# Patient Record
Sex: FEMALE | Race: WHITE | NOT HISPANIC OR LATINO | Employment: OTHER | URBAN - METROPOLITAN AREA
[De-identification: names, ages, dates, MRNs, and addresses within clinical notes are randomized per-mention and may not be internally consistent; named-entity substitution may affect disease eponyms.]

---

## 2023-10-30 PROBLEM — D21.9 FIBROIDS: Status: ACTIVE | Noted: 2023-10-30

## 2023-10-30 PROBLEM — F41.9 ANXIETY DISORDER: Status: ACTIVE | Noted: 2023-10-30

## 2023-10-30 PROBLEM — K82.4 GALLBLADDER POLYP: Status: ACTIVE | Noted: 2023-10-30

## 2023-10-30 PROBLEM — D25.1 INTRAMURAL LEIOMYOMA OF UTERUS: Status: ACTIVE | Noted: 2023-10-30

## 2023-10-30 PROBLEM — E78.5 HYPERLIPEMIA: Status: ACTIVE | Noted: 2023-10-30

## 2023-10-30 PROBLEM — R00.0 TACHYCARDIA: Status: ACTIVE | Noted: 2023-10-30

## 2023-10-30 PROBLEM — G47.30 HYPERSOMNIA WITH SLEEP APNEA: Status: ACTIVE | Noted: 2023-10-30

## 2023-10-30 PROBLEM — R93.89 THICKENED ENDOMETRIUM: Status: ACTIVE | Noted: 2023-10-30

## 2023-10-30 PROBLEM — R91.1 LUNG NODULE: Status: ACTIVE | Noted: 2023-10-30

## 2023-10-30 PROBLEM — N39.46 URGE AND STRESS INCONTINENCE: Status: ACTIVE | Noted: 2023-10-30

## 2023-10-30 PROBLEM — R10.9 ABDOMINAL CRAMPING: Status: ACTIVE | Noted: 2023-10-30

## 2023-10-30 PROBLEM — R94.39 ABNORMAL STRESS TEST: Status: ACTIVE | Noted: 2023-10-30

## 2023-10-30 PROBLEM — R10.32 CHRONIC LLQ PAIN: Status: ACTIVE | Noted: 2023-10-30

## 2023-10-30 PROBLEM — G89.29 CHRONIC LLQ PAIN: Status: ACTIVE | Noted: 2023-10-30

## 2023-10-30 PROBLEM — J45.909 ASTHMA (HHS-HCC): Status: ACTIVE | Noted: 2023-10-30

## 2023-10-30 PROBLEM — R53.83 FATIGUE: Status: ACTIVE | Noted: 2023-10-30

## 2023-10-30 PROBLEM — R10.2 FEMALE PELVIC PAIN: Status: ACTIVE | Noted: 2023-10-30

## 2023-10-30 PROBLEM — R06.02 SHORTNESS OF BREATH ON EXERTION: Status: ACTIVE | Noted: 2023-10-30

## 2023-10-30 PROBLEM — R10.11 POSTPRANDIAL RUQ PAIN: Status: ACTIVE | Noted: 2023-10-30

## 2023-10-30 PROBLEM — E66.9 OBESITY (BMI 30-39.9): Status: ACTIVE | Noted: 2023-10-30

## 2023-10-30 PROBLEM — I49.3 VENTRICULAR ECTOPY: Status: ACTIVE | Noted: 2023-10-30

## 2023-10-30 PROBLEM — G25.81 RESTLESS LEGS: Status: ACTIVE | Noted: 2023-10-30

## 2023-10-30 PROBLEM — R10.13 CHRONIC EPIGASTRIC PAIN: Status: ACTIVE | Noted: 2023-10-30

## 2023-10-30 PROBLEM — G47.33 OSA (OBSTRUCTIVE SLEEP APNEA): Status: ACTIVE | Noted: 2023-10-30

## 2023-10-30 PROBLEM — R09.02 HYPOXIA: Status: ACTIVE | Noted: 2023-10-30

## 2023-10-30 PROBLEM — G47.10 HYPERSOMNIA WITH SLEEP APNEA: Status: ACTIVE | Noted: 2023-10-30

## 2023-10-30 PROBLEM — K21.9 GERD (GASTROESOPHAGEAL REFLUX DISEASE): Status: ACTIVE | Noted: 2023-10-30

## 2023-10-30 PROBLEM — G89.29 CHRONIC EPIGASTRIC PAIN: Status: ACTIVE | Noted: 2023-10-30

## 2023-10-30 RX ORDER — LEVALBUTEROL TARTRATE 45 UG/1
2 AEROSOL, METERED ORAL EVERY 4 HOURS PRN
COMMUNITY
Start: 2022-05-17 | End: 2024-03-15 | Stop reason: SDUPTHER

## 2023-10-30 RX ORDER — BUSPIRONE HYDROCHLORIDE 15 MG/1
15 TABLET ORAL EVERY 12 HOURS
COMMUNITY
End: 2024-05-17 | Stop reason: SDUPTHER

## 2023-10-30 RX ORDER — HYDROCHLOROTHIAZIDE 25 MG/1
1 TABLET ORAL DAILY
COMMUNITY
Start: 2019-10-17

## 2023-10-30 RX ORDER — VIBEGRON 75 MG/1
TABLET, FILM COATED ORAL
COMMUNITY
Start: 2022-07-21 | End: 2024-05-07 | Stop reason: WASHOUT

## 2023-10-30 RX ORDER — TIZANIDINE 4 MG/1
1 TABLET ORAL 3 TIMES DAILY PRN
COMMUNITY

## 2023-10-30 RX ORDER — MONTELUKAST SODIUM 10 MG/1
1 TABLET ORAL NIGHTLY
COMMUNITY
Start: 2021-01-05 | End: 2023-12-08 | Stop reason: SDUPTHER

## 2023-10-30 RX ORDER — PANTOPRAZOLE SODIUM 40 MG/1
1 TABLET, DELAYED RELEASE ORAL DAILY
COMMUNITY
Start: 2021-01-05 | End: 2023-10-31 | Stop reason: SDUPTHER

## 2023-10-30 RX ORDER — ACETAMINOPHEN 325 MG/1
TABLET ORAL
COMMUNITY
Start: 2020-08-21

## 2023-10-30 RX ORDER — FERROUS GLUCONATE 325 MG
38 TABLET ORAL NIGHTLY
COMMUNITY

## 2023-10-30 RX ORDER — CARVEDILOL 25 MG/1
1 TABLET ORAL
COMMUNITY
Start: 2019-10-17

## 2023-10-30 RX ORDER — FAMOTIDINE 20 MG/1
TABLET, FILM COATED ORAL
COMMUNITY
Start: 2022-02-01 | End: 2023-10-31 | Stop reason: SDUPTHER

## 2023-10-30 RX ORDER — BUSPIRONE HYDROCHLORIDE 10 MG/1
1 TABLET ORAL 2 TIMES DAILY
COMMUNITY
Start: 2021-01-05 | End: 2023-10-31 | Stop reason: WASHOUT

## 2023-10-30 RX ORDER — SERTRALINE HYDROCHLORIDE 100 MG/1
1.5 TABLET, FILM COATED ORAL DAILY
COMMUNITY
Start: 2019-10-17

## 2023-10-30 RX ORDER — DICYCLOMINE HYDROCHLORIDE 10 MG/1
CAPSULE ORAL
COMMUNITY
Start: 2020-12-21 | End: 2024-05-30 | Stop reason: SDUPTHER

## 2023-10-31 ENCOUNTER — OFFICE VISIT (OUTPATIENT)
Dept: GASTROENTEROLOGY | Facility: CLINIC | Age: 68
End: 2023-10-31
Payer: MEDICARE

## 2023-10-31 VITALS
WEIGHT: 194 LBS | HEIGHT: 60 IN | OXYGEN SATURATION: 96 % | SYSTOLIC BLOOD PRESSURE: 128 MMHG | BODY MASS INDEX: 38.09 KG/M2 | HEART RATE: 84 BPM | DIASTOLIC BLOOD PRESSURE: 86 MMHG

## 2023-10-31 DIAGNOSIS — K21.9 GASTROESOPHAGEAL REFLUX DISEASE WITHOUT ESOPHAGITIS: Primary | ICD-10-CM

## 2023-10-31 DIAGNOSIS — R10.9 ABDOMINAL CRAMPING: ICD-10-CM

## 2023-10-31 PROCEDURE — 99214 OFFICE O/P EST MOD 30 MIN: CPT | Performed by: NURSE PRACTITIONER

## 2023-10-31 PROCEDURE — 1036F TOBACCO NON-USER: CPT | Performed by: NURSE PRACTITIONER

## 2023-10-31 PROCEDURE — 1159F MED LIST DOCD IN RCRD: CPT | Performed by: NURSE PRACTITIONER

## 2023-10-31 RX ORDER — PANTOPRAZOLE SODIUM 40 MG/1
40 TABLET, DELAYED RELEASE ORAL DAILY
Qty: 90 TABLET | Refills: 3 | Status: SHIPPED | OUTPATIENT
Start: 2023-10-31

## 2023-10-31 RX ORDER — FAMOTIDINE 20 MG/1
20 TABLET, FILM COATED ORAL
Qty: 90 TABLET | Refills: 3 | Status: SHIPPED | OUTPATIENT
Start: 2023-10-31

## 2023-10-31 RX ORDER — ROSUVASTATIN CALCIUM 20 MG/1
TABLET, COATED ORAL EVERY 24 HOURS
COMMUNITY
Start: 2023-06-06

## 2023-10-31 ASSESSMENT — ENCOUNTER SYMPTOMS
PALPITATIONS: 0
CHILLS: 0
WOUND: 0
TROUBLE SWALLOWING: 0
WEAKNESS: 0
ROS GI COMMENTS: SEE HPI
DIZZINESS: 0
COUGH: 0
SORE THROAT: 0
BRUISES/BLEEDS EASILY: 0
ARTHRALGIAS: 0
ADENOPATHY: 0
JOINT SWELLING: 0
SHORTNESS OF BREATH: 0
CONFUSION: 0
FEVER: 0
DIFFICULTY URINATING: 0

## 2023-10-31 NOTE — ASSESSMENT & PLAN NOTE
Symptoms well controlled with pantoprazole 40mg daily and famotidine 20mg daily. EGD in November 2020 showed mild gastritis and fundic gland polyps, but was otherwise normal. Continue current medications; pt informed of PPI risks.

## 2023-10-31 NOTE — PROGRESS NOTES
Subjective   Patient ID:     HPI  RADU GARCIA is a 68 year old female with past medical history of covid-19, lung nodule, gastric ulcer, anxiety, depression, and HTN who presents for follow up-- med refills     PCP is Dr. Quiles     Patient last seen 9/2022 for follow up. Her reflux was well-controlled with pantoprazole 40mg daily and famotidine 20mg daily at that time. She was taking bentyl 10mg once daily and IBgard once daily which helped with the abdominal pain she had. CT A/P in 2020 showed uterine fibroids which she has seen gynecology for and biopsy was unremarkable; per her gynecologist no further action was needed. She also saw general surgery for a gallbladder polyp and repeat imaging was completed; it had not grown and size and did not require further follow up per surgery.     She continues to do well from a GI standpoint. Reflux remains well controlled with her pantoprazole 40mg daily and famotidine 20mg daily. She is using the bentyl and IBgard once daily which has continued to help with the abdominal pain. No dizziness, blurry vision, dry mouth, trouble urinating, or constipation issues with her medication. She occasionally has breakthrough heartburn with overeating.     She otherwise denies unintended weight loss, dysphagia, N/V, hematemesis, melena, diarrhea, constipation, or hematochezia.       Summary of endoscopies:  - Colonoscopy 1/2018: normal   - EGD 8/2020: multiple gastric erosions and superficial ulcers, benign fundic gland polyps, normal esophagus and duodenum   - EGD 11/2020: normal esophagus, mild gastritis, gastric polyps, mild duodenum. (polyps are fundic gland polyps on pathology)       Social Hx:  tobacco: none  etoh: none  illicit drug use: none  NSAIDs: none    Family Hx:  No family hx GI malignancy, pancreatitis,or IBD        Summary of endoscopies:        Review of Systems   Constitutional:  Negative for chills and fever.   HENT:  Negative for sore throat and trouble  swallowing.    Respiratory:  Negative for cough and shortness of breath.    Cardiovascular:  Negative for chest pain and palpitations.   Gastrointestinal:         SEE HPI   Endocrine: Negative for cold intolerance and heat intolerance.   Genitourinary:  Negative for difficulty urinating.   Musculoskeletal:  Negative for arthralgias and joint swelling.   Skin:  Negative for rash and wound.   Neurological:  Negative for dizziness and weakness.   Hematological:  Negative for adenopathy. Does not bruise/bleed easily.   Psychiatric/Behavioral:  Negative for confusion.         Medications:  Prior to Admission medications    Medication Sig Start Date End Date Taking? Authorizing Provider   acetaminophen (TylenoL) 325 mg tablet Take by mouth. 8/21/20  Yes Historical Provider, MD   busPIRone (Buspar) 15 mg tablet Take 1 tablet (15 mg) by mouth every 12 hours.   Yes Historical Provider, MD   carvedilol (Coreg) 25 mg tablet Take 1 tablet (25 mg) by mouth 2 times a day with meals. 10/17/19  Yes Historical Provider, MD   dicyclomine (Bentyl) 10 mg capsule Take by mouth. 12/21/20  Yes Historical Provider, MD   famotidine (Pepcid) 20 mg tablet Take by mouth once daily. 2/1/22  Yes Historical Provider, MD   ferrous gluconate (Fergon) 324 (38 Fe) mg tablet Take 1 tablet (38 mg of iron) by mouth once daily at bedtime.   Yes Historical Provider, MD   hydroCHLOROthiazide (HYDRODiuril) 25 mg tablet Take 1 tablet (25 mg) by mouth once daily. 10/17/19  Yes Historical Provider, MD   levalbuterol (Xopenex HFA) 45 mcg/actuation inhaler Inhale 2 puffs every 4 hours if needed. 5/17/22  Yes Historical Provider, MD   montelukast (Singulair) 10 mg tablet Take 1 tablet (10 mg) by mouth once daily at bedtime. 1/5/21  Yes Historical Provider, MD   pantoprazole (ProtoNix) 40 mg EC tablet Take 1 tablet (40 mg) by mouth once daily. 1/5/21  Yes Historical Provider, MD   rosuvastatin (Crestor) 20 mg tablet once every 24 hours. 6/6/23  Yes Historical  Provider, MD   sertraline (Zoloft) 100 mg tablet Take 1.5 tablets (150 mg) by mouth once daily. 10/17/19  Yes Historical Provider, MD   tiZANidine (Zanaflex) 4 mg tablet Take 1 tablet (4 mg) by mouth 3 times a day as needed.   Yes Historical Provider, MD   vibegron (Gemtesa) 75 mg tablet Take by mouth. 7/21/22  Yes Historical Provider, MD   busPIRone (Buspar) 10 mg tablet Take 1 tablet (10 mg) by mouth 2 times a day. 1/5/21 10/31/23 Yes Historical Provider, MD       Allergies:  Ace inhibitors    Past Medical History:  She has a past medical history of Anxiety disorder, unspecified, Muscle spasm of back, Personal history of other diseases of the circulatory system, and Personal history of peptic ulcer disease (12/21/2020).    Past Surgical History:  She has a past surgical history that includes Other surgical history (07/21/2020).    Social History:  She reports that she has never smoked. She has never used smokeless tobacco. She reports current alcohol use. She reports that she does not use drugs.    Objective   Physical exam:  Physical Exam  Constitutional:       General: She is not in acute distress.     Appearance: Normal appearance.   HENT:      Mouth/Throat:      Mouth: Mucous membranes are moist.      Comments: pink  Eyes:      Conjunctiva/sclera: Conjunctivae normal.      Pupils: Pupils are equal, round, and reactive to light.   Cardiovascular:      Rate and Rhythm: Normal rate and regular rhythm.      Heart sounds: No murmur heard.  Pulmonary:      Effort: Pulmonary effort is normal.      Breath sounds: Normal breath sounds.   Abdominal:      General: Bowel sounds are normal. There is no distension.      Palpations: Abdomen is soft.      Tenderness: There is no abdominal tenderness. There is no guarding.   Skin:     General: Skin is warm and dry.      Coloration: Skin is not jaundiced.   Neurological:      Mental Status: She is alert and oriented to person, place, and time.   Psychiatric:         Mood and  Affect: Mood normal.         Behavior: Behavior normal.          Assessment/Plan   Problem List Items Addressed This Visit             ICD-10-CM    Abdominal cramping R10.9     Improved with dicyclomine 10mg daily and IBgard once daily. She denies any side effects from medication; discussed increased risks of side effects today with being over age 65.          GERD (gastroesophageal reflux disease) - Primary K21.9     Symptoms well controlled with pantoprazole 40mg daily and famotidine 20mg daily. EGD in November 2020 showed mild gastritis and fundic gland polyps, but was otherwise normal. Continue current medications; pt informed of PPI risks.          Relevant Medications    famotidine (Pepcid) 20 mg tablet    pantoprazole (ProtoNix) 40 mg EC tablet    Other Relevant Orders    Follow Up In Gastroenterology              YOVANI Simms-CNP

## 2023-10-31 NOTE — PATIENT INSTRUCTIONS
Thank you for coming to your appointment today   - I will send refills of the pantoprazole and famotidine for 1 year  - Let me know if you need refills of dicyclomine; we discussed the side effects of this today including dizziness, increase risks of falls, dry mouth, blurry vision, inability to urinate, and constipation   - Follow up in 1 year or sooner if needed     Call with any questions or concerns [phone]       You have been prescribed a medication to block acid in your stomach such as Omeprazole (Prilosec), Pantoprazole (Protonix), Esomeprazole (Nexium), Lansoprazole (Prevacid), or Dexlansoprazole (Dexilant).  These medications are in a class known as “Proton Pump Inhibitors” or “PPIs” and they all work in the same way.  They work by blocking acid pumps in your stomach to limit the amount of acid that is produced.  These medications are very common and many are available over the counter.  As with any medication there are risks of side effects from these medications and the risks are likely similar regardless of which specific medication you are on.  There are some reported risks that you may hear about which are detailed below.    PPIs are the most effective medical treatment for GERD (gastroesophageal reflux disease). Some medical studies have identified an association between the long-term use of PPIs and the development of numerous adverse conditions including intestinal infections, pneumonia, stomach cancer, osteoporosis-related bone fracture, chronic kidney disease, deficiencies of certain vitamins and minerals, heart attacks, strokes, dementia, and early death. Those studies have flaws, are not considered definitive, and do not establish a cause-and-effect relationship between PPIs and the adverse conditions. High-quality studies have found that PPIs do not significantly increase the risk of any of these conditions except certain intestinal infections. Nevertheless, we cannot exclude the  possibility that PPIs might confer a small increase in the risk of developing these adverse conditions. For the treatment of GERD, gastroenterologists generally agree that the well-established benefits of PPIs far outweigh their theoretical risks.    Kidney Disease:  Several studies have shown a weak association between PPI use and chronic kidney disease.  There have been limitations with these studies and the actual risk is not known.  Some data from these studies suggested that long term PPI use may actually protect against kidney disease.  The data is mixed and it is not certain that PPIs increase the risk of kidney disease.  National GI societies do not recommend routinely checking kidney function while you are on these medications.    Dementia:  There previously were studies which suggested a link between PPI use and risk of dementia.  Further studies have not shown this risk.    Bone Health:  Decreased acid in the stomach may impair absorption of minerals important for bone health such as calcium.  The data is conflicting and some studies suggest an increased risk of fractures while others do not.  Absorption of only some forms of calcium is impaired and there should be no decrease in absorption of calcium contained in milk, cheese, or other dairy products.  Any calcium consumed as a part of a slightly acidic meal is also not affected.  National GI societies do not recommend changing bone health screening (DEXA scans) recommendations or routinely testing calcium levels or taking a calcium supplement for patients on PPI therapy.  Your primary care provider can talk to you about your specific fracture risk and what screening or nutritional supplements you should be on.    Infections:  There appears to be an increased risk of certain infections such a Clostridium difficile (C diff) although this risk appears to be most important in certain populations and this risk does not generally require that PPI therapy be  stopped.  At times there have been studies that suggested an increased risk of pneumonia in patients on PPI therapy.  Additional studies have not shown this risk and the initial studies did not show the risk in patients on long term therapy, but rather those who had recently started the medication.  Probiotics are not recommended to prevent these infections.    Overall these medications are considered safe.  As with any medication it is a good idea to be on the lowest effective dose for treating your condition and some patients may be able to pursue a trial of stopping these medications to see if they actually require long term therapy.  Overall, the recommendations from national GI societies is to continue these medications in patients with a clear indication for them.

## 2023-10-31 NOTE — ASSESSMENT & PLAN NOTE
Improved with dicyclomine 10mg daily and IBgard once daily. She denies any side effects from medication; discussed increased risks of side effects today with being over age 63.

## 2023-12-05 ENCOUNTER — TELEPHONE (OUTPATIENT)
Dept: UROLOGY | Facility: CLINIC | Age: 68
End: 2023-12-05
Payer: MEDICARE

## 2023-12-08 ENCOUNTER — OFFICE VISIT (OUTPATIENT)
Dept: PULMONOLOGY | Facility: HOSPITAL | Age: 68
End: 2023-12-08
Payer: MEDICARE

## 2023-12-08 VITALS
DIASTOLIC BLOOD PRESSURE: 71 MMHG | OXYGEN SATURATION: 93 % | SYSTOLIC BLOOD PRESSURE: 112 MMHG | HEART RATE: 75 BPM | WEIGHT: 195.3 LBS | HEIGHT: 60 IN | TEMPERATURE: 98.7 F | RESPIRATION RATE: 16 BRPM | BODY MASS INDEX: 38.34 KG/M2

## 2023-12-08 DIAGNOSIS — J30.9 ALLERGIC RHINITIS, UNSPECIFIED SEASONALITY, UNSPECIFIED TRIGGER: ICD-10-CM

## 2023-12-08 DIAGNOSIS — J45.909 ASTHMA, UNSPECIFIED ASTHMA SEVERITY, UNSPECIFIED WHETHER COMPLICATED, UNSPECIFIED WHETHER PERSISTENT (HHS-HCC): Primary | ICD-10-CM

## 2023-12-08 DIAGNOSIS — F41.9 ANXIETY: ICD-10-CM

## 2023-12-08 DIAGNOSIS — R91.1 LUNG NODULE: ICD-10-CM

## 2023-12-08 DIAGNOSIS — G47.33 OSA (OBSTRUCTIVE SLEEP APNEA): ICD-10-CM

## 2023-12-08 PROCEDURE — 99213 OFFICE O/P EST LOW 20 MIN: CPT | Mod: ZK | Performed by: NURSE PRACTITIONER

## 2023-12-08 PROCEDURE — 1159F MED LIST DOCD IN RCRD: CPT | Performed by: NURSE PRACTITIONER

## 2023-12-08 PROCEDURE — 1036F TOBACCO NON-USER: CPT | Performed by: NURSE PRACTITIONER

## 2023-12-08 PROCEDURE — 99213 OFFICE O/P EST LOW 20 MIN: CPT | Performed by: NURSE PRACTITIONER

## 2023-12-08 RX ORDER — MONTELUKAST SODIUM 10 MG/1
10 TABLET ORAL NIGHTLY
Qty: 30 TABLET | Refills: 11 | Status: SHIPPED | OUTPATIENT
Start: 2023-12-08

## 2023-12-08 ASSESSMENT — ENCOUNTER SYMPTOMS
CHILLS: 0
COUGH: 0
UNEXPECTED WEIGHT CHANGE: 0
FEVER: 0
WHEEZING: 0
RHINORRHEA: 0
SHORTNESS OF BREATH: 1
FATIGUE: 0

## 2023-12-08 NOTE — PATIENT INSTRUCTIONS
1. Continue Xopenex 1-2 puffs as needed for shortness of breath, wheezing, or before prolonged activity.   2. Continue buspirone and will increase to 15 mg 1 tablet twice daily for anxiety.   3. Continue montelukast for asthma 1 tablet nightly.   4. Call with any questions or concerns.   5. Follow up with me as needed.

## 2023-12-08 NOTE — PROGRESS NOTES
Subjective   Patient ID: Luciana Figueroa is a 68 y.o. female who presents for asthma follow up.     HPI: Patient has PMH of asthma, covid, lung nodule, allergic rhinitis, anxiety, depression, and HTN. She states that her breathing is stable and she only uses Xopenex with weather changes mostly. She states Montelukast helps improve s/s. She has GARY but noncompliant with CPAP. She has no other concerns.     Review of Systems   Constitutional:  Negative for chills, fatigue, fever and unexpected weight change.   HENT:  Negative for congestion, postnasal drip and rhinorrhea.    Respiratory:  Positive for shortness of breath. Negative for cough (denies hemoptysis.) and wheezing.    Cardiovascular:  Negative for chest pain and leg swelling.   All other systems reviewed and are negative.      Objective   Physical Exam  Vitals reviewed.   Constitutional:       Appearance: Normal appearance.   HENT:      Head: Normocephalic.   Cardiovascular:      Rate and Rhythm: Normal rate and regular rhythm.   Pulmonary:      Effort: Pulmonary effort is normal.      Breath sounds: Normal breath sounds.   Skin:     General: Skin is warm and dry.   Neurological:      Mental Status: She is alert.         Assessment/Plan   1. Bronchial Asthma  2. Allergic rhinitis  3. Obstructive sleep apnea, noncompliant   4. Right lower lobe lung nodule stable since Dec 2019  5. Anxiety disorder  6. Obesity  7. Stage I diastolic dysfunction  8. Hypoxia, Obesity hypoventilation   9. GERD/Hiatal hernia and PUD     Plan:  -She was unable to afford Breo and has tried multiple other maintenance inhalers which did not help. Will continue on Xopenex prn.   -Continue Montelukast HS  -CT chest for follow up on lung nodule is stable since April 2020.   -I recommend a psychiatry consult for her. Continue buspirone for anxiety and increase to 15 mg BID.   -Weight loss as discussed.  -Cont pantoprazole  -HST discussed showing GARY that is severe she has been unable to  tolerate CPAP due to anxiety. She states she is going to send CPAP back to DME. I discussed risks of untreated GARY at length. We discussed risk of cerebrovascular, cardiovascular and metabolic morbidity and mortality associated with untreated GARY. SHe states there is no way she will use CPAP. She has been evaluated by ENT for inspire and not deemed a candidate due to BMI >35, oral appliance not indicated and not interested in that. She will work on weight loss and sleep on her side.  -6MWT with exertional hypoxia of 91% but did not require O2 supplementation. Suspect this is secondary to OHS. Repeat 6MWT without need for O2 supplementation with activity.  -Iron panel with Iron 61 and ferritin 19. Recommend iron supplementation also from RLS standpoint.   -Discussed weight loss program such as silver sneaker, increase physical activity regimen         Overall her breathing is stable on Xopenex prn and Montelukast. She has no other concerns and uninterested in restarting CPAP. I will have her follow up on an as needed basis.

## 2024-02-05 NOTE — PROGRESS NOTES
[facility] - Dr. Sameer Milligan    Established Patient  Visit    PCP: Elsie Quiles MD    Chief Complaint/Reason for visit: annual FU OAB    HPI:   On vibegron 75 mg every day  No side effects  No interval UTI  Down to 1-2 pads per day  VERY happy    Past Medical History:   Diagnosis Date    Anxiety disorder, unspecified     Anxiety and depression    Muscle spasm of back     Back spasm    Personal history of other diseases of the circulatory system     History of hypertension    Personal history of peptic ulcer disease 12/21/2020    History of gastric ulcer     Past Surgical History:   Procedure Laterality Date    OTHER SURGICAL HISTORY  07/21/2020    Colonoscopy     Social History     Socioeconomic History    Marital status:      Spouse name: Not on file    Number of children: Not on file    Years of education: Not on file    Highest education level: Not on file   Occupational History    Not on file   Tobacco Use    Smoking status: Never    Smokeless tobacco: Never   Substance and Sexual Activity    Alcohol use: Yes     Comment: occassionally    Drug use: Never    Sexual activity: Not on file   Other Topics Concern    Not on file   Social History Narrative    Not on file     Social Determinants of Health     Financial Resource Strain: Not on file   Food Insecurity: Not on file   Transportation Needs: Not on file   Physical Activity: Not on file   Stress: Not on file   Social Connections: Not on file   Intimate Partner Violence: Not on file   Housing Stability: Not on file     Current Outpatient Medications   Medication Instructions    acetaminophen (TylenoL) 325 mg tablet oral    busPIRone (BUSPAR) 15 mg, oral, Every 12 hours    carvedilol (Coreg) 25 mg tablet 1 tablet, oral, 2 times daily with meals    dicyclomine (Bentyl) 10 mg capsule oral    famotidine (PEPCID) 20 mg, oral, Daily RT    ferrous gluconate (Fergon) 324 (38 Fe) mg tablet 38 mg of iron, oral, Nightly    hydroCHLOROthiazide  (HYDRODiuril) 25 mg tablet 1 tablet, oral, Daily    levalbuterol (Xopenex HFA) 45 mcg/actuation inhaler 2 puffs, inhalation, Every 4 hours PRN    montelukast (SINGULAIR) 10 mg, oral, Nightly    pantoprazole (PROTONIX) 40 mg, oral, Daily    rosuvastatin (Crestor) 20 mg tablet Every 24 hours    sertraline (Zoloft) 100 mg tablet 1.5 tablets, oral, Daily    tiZANidine (Zanaflex) 4 mg tablet 1 tablet, oral, 3 times daily PRN    vibegron (Gemtesa) 75 mg tablet oral     Allergies   Allergen Reactions    Ace Inhibitors Unknown          Physical Exam:  General: Alert, cooperative, no acute distress  Eyes: Sclera clear  Cardiac: Extremities are warm and well perfused  Lungs: Breathing non-labored. Speaking in clear and complete sentences.  MSK: Ambulatory with steady gait   Neuro: Alert and oriented to person, place, and time  Psych: Normal mood and affect  Skin: No obvious lesions or rashes    Assessment and Plan:    1. Overactive bladder  Doing well on vibegron  Discussed 3rd line OAB therapies - defers for now  FUV 3-6 months Sara rechricarda

## 2024-02-06 ENCOUNTER — OFFICE VISIT (OUTPATIENT)
Dept: UROLOGY | Facility: CLINIC | Age: 69
End: 2024-02-06
Payer: MEDICARE

## 2024-02-06 DIAGNOSIS — N32.81 OVERACTIVE BLADDER: Primary | ICD-10-CM

## 2024-02-06 PROCEDURE — 1159F MED LIST DOCD IN RCRD: CPT | Performed by: STUDENT IN AN ORGANIZED HEALTH CARE EDUCATION/TRAINING PROGRAM

## 2024-02-06 PROCEDURE — 99213 OFFICE O/P EST LOW 20 MIN: CPT | Performed by: STUDENT IN AN ORGANIZED HEALTH CARE EDUCATION/TRAINING PROGRAM

## 2024-02-06 PROCEDURE — 1036F TOBACCO NON-USER: CPT | Performed by: STUDENT IN AN ORGANIZED HEALTH CARE EDUCATION/TRAINING PROGRAM

## 2024-02-09 ENCOUNTER — DOCUMENTATION (OUTPATIENT)
Dept: UROLOGY | Facility: CLINIC | Age: 69
End: 2024-02-09
Payer: MEDICARE

## 2024-02-26 NOTE — PROGRESS NOTES
Counseling:  The patient was counseled regarding diagnostic results, instructions for management, risk factor reductions, prognosis, patient and family education, impressions, risks and benefits of treatment options and importance of compliance with treatment.      Chief Complaint:    The patient presents today for annual followup of exertional SOB.    History Of Present Illness:    Luciana Figueroa is a 68 year old female patient who presents today for annual followup of exertional SOB. Her PMH is significant for asthma, hypersomnia with GARY, anxiety, GERD, and hyperlipidemia. Over the past year, the patient states that she has done well from a cardiac standpoint. She reports persistent exertional SOB, although stable and at baseline. She denies any chest pain or discomfort. BP has been stable. EKG today is stable with no acute changes. The patient is compliant with her prescribed medications.     Last Recorded Vitals:  Vitals:    02/27/24 1320   BP: 118/88   BP Location: Left arm   Pulse: 67   Weight: 87.1 kg (192 lb)   Height: 1.524 m (5')       Past Surgical History:  She has a past surgical history that includes Other surgical history (07/21/2020).      Social History:  She reports that she has never smoked. She has never used smokeless tobacco. She reports current alcohol use. She reports that she does not use drugs.    Family History:  Family History   Problem Relation Name Age of Onset    Heart failure Mother      Coronary artery disease Father      Lung cancer Brother      Breast cancer Father's Sister          Allergies:  Patient has no known allergies.    Outpatient Medications:  Current Outpatient Medications   Medication Instructions    acetaminophen (TylenoL) 325 mg tablet oral    busPIRone (BUSPAR) 15 mg, oral, Every 12 hours    carvedilol (Coreg) 25 mg tablet 1 tablet, oral, 2 times daily with meals    dicyclomine (Bentyl) 10 mg capsule oral    famotidine (PEPCID) 20 mg, oral, Daily RT    ferrous  gluconate (Fergon) 324 (38 Fe) mg tablet 38 mg of iron, oral, Nightly    hydroCHLOROthiazide (HYDRODiuril) 25 mg tablet 1 tablet, oral, Daily    levalbuterol (Xopenex HFA) 45 mcg/actuation inhaler 2 puffs, inhalation, Every 4 hours PRN    montelukast (SINGULAIR) 10 mg, oral, Nightly    pantoprazole (PROTONIX) 40 mg, oral, Daily    rosuvastatin (Crestor) 20 mg tablet Every 24 hours    sertraline (Zoloft) 100 mg tablet 1.5 tablets, oral, Daily    tiZANidine (Zanaflex) 4 mg tablet 1 tablet, oral, 3 times daily PRN    vibegron (Gemtesa) 75 mg tablet oral     Review of Systems   Cardiovascular:  Positive for dyspnea on exertion.   All other systems reviewed and are negative.     Physical Exam:  Constitutional:       Appearance: Healthy appearance. Not in distress.   Neck:      Vascular: No JVR. JVD normal.   Pulmonary:      Effort: Pulmonary effort is normal.      Breath sounds: Normal breath sounds. No wheezing. No rhonchi. No rales.   Chest:      Chest wall: Not tender to palpatation.   Cardiovascular:      PMI at left midclavicular line. Normal rate. Regular rhythm. Normal S1. Normal S2.       Murmurs: There is no murmur.      No gallop.  No click. No rub.   Pulses:     Intact distal pulses.   Edema:     Peripheral edema absent.   Abdominal:      General: Bowel sounds are normal.      Palpations: Abdomen is soft.      Tenderness: There is no abdominal tenderness.   Musculoskeletal: Normal range of motion.         General: No tenderness. Skin:     General: Skin is warm and dry.   Neurological:      General: No focal deficit present.      Mental Status: Alert and oriented to person, place and time.          Last Labs:  CBC -  Lab Results   Component Value Date    WBC 7.9 05/03/2022    HGB 14.4 05/03/2022    HCT 43.8 05/03/2022    MCV 90 05/03/2022     05/03/2022       CMP -  Lab Results   Component Value Date    CALCIUM 9.6 05/03/2022    PROT 7.3 05/03/2022    ALBUMIN 4.2 05/03/2022    AST 15 05/03/2022    ALT 15  05/03/2022    ALKPHOS 69 05/03/2022    BILITOT 0.4 05/03/2022       RENAL FUNCTION PANEL -   Lab Results   Component Value Date    GLUCOSE 88 05/03/2022     05/03/2022    K 3.4 (L) 05/03/2022     05/03/2022    CO2 28 05/03/2022    ANIONGAP 11 05/03/2022    BUN 21 05/03/2022    CREATININE 0.72 05/03/2022    CALCIUM 9.6 05/03/2022    ALBUMIN 4.2 05/03/2022        Lab Results   Component Value Date    BNP 28 10/26/2020       Last Cardiology Tests:  10/21/2020 - CT Chest w/Contrast  1. Bilateral patchy opacities consistent with active pneumonitis/pneumonia. This can be seen with COVID-19 infection.  2. Stable 1 cm smooth nonspecific noncalcified right lower lobe pulmonary nodule compared to 04/06/2020 with no other older studies for comparison.     04/06/2020 - TTE  1. The left ventricular systolic function is normal with a 60% estimated ejection fraction.  2. Spectral Doppler shows an impaired relaxation pattern of left ventricular diastolic filling.  3. RVSP within normal limits.  4. Aortic valve stenosis is not present.     04/06/2020 - CT Angio Chest for PE  1. No evidence of pulmonary embolism.  2. Right lower lobe 1.1 cm solitary pulmonary nodule. It is stable when compared to short-term prior imaging, CT abdomen and pelvis from November 2019; however, there is no long-term imaging available for comparison. Possible considerations include PET-CT and/or follow-up chest CT in 3-6 months.  3. No evidence of any enlarged mediastinal or hilar lymph nodes.     03/09/2020 - CT Chest w/o Contrast   Clear lungs. No acute process.     01/27/2020 - Cardiac Catheterization (LH)  No evidence of significant coronary artery disease.     01/08/2020 - Exercise Stress Test  1. Abnormal stress test due to chest pain and ventricular ectopy.  2. The inadequate level of stress was achieved. The patient achieved 1:36 minutes with a MPHR 89%  3. Very poor exercise capacity.  4. Consider additional cardiac evaluation.      Assessment/Plan   1) Exertional SOB   Continue carvedilol 25 mg BID, HCTZ 25 mg daily, rosuvastatin 20 mg daily  LHC negative in Jan. 2020  Negative echocardiogram April 2020  No PE by CT chest April 2020  Repeat CT chest 04/2021 with bronchitis and stable right lower lobe pulmonary nodule - follows with pulmonology  Persistent exertional SOB - stable and at baseline  Denies CP or discomfort  EKG stable  BP stable  Check echo  Check Lexiscan Cardiolite stress test - unable to tolerate treadmill stress s/t poor functional capacity     2) Lung Nodule  Followed by Pulmonary   CT chest 04/2021 with stable RLL pulmonary nodule   CT chest 03/2023 with stable findings     3) GARY  Management per pulmonary  Does not tolerate BiPAP/CPAP      Scribe Attestation  By signing my name below, I, Paulina Angulo   attest that this documentation has been prepared under the direction and in the presence of Osvaldo Barnes MD.

## 2024-02-27 ENCOUNTER — OFFICE VISIT (OUTPATIENT)
Dept: CARDIOLOGY | Facility: CLINIC | Age: 69
End: 2024-02-27
Payer: MEDICARE

## 2024-02-27 VITALS
DIASTOLIC BLOOD PRESSURE: 88 MMHG | HEART RATE: 67 BPM | WEIGHT: 192 LBS | SYSTOLIC BLOOD PRESSURE: 118 MMHG | BODY MASS INDEX: 37.69 KG/M2 | HEIGHT: 60 IN

## 2024-02-27 DIAGNOSIS — R06.02 SHORTNESS OF BREATH ON EXERTION: Primary | ICD-10-CM

## 2024-02-27 PROCEDURE — 93000 ELECTROCARDIOGRAM COMPLETE: CPT | Performed by: INTERNAL MEDICINE

## 2024-02-27 PROCEDURE — 1159F MED LIST DOCD IN RCRD: CPT | Performed by: INTERNAL MEDICINE

## 2024-02-27 PROCEDURE — 1160F RVW MEDS BY RX/DR IN RCRD: CPT | Performed by: INTERNAL MEDICINE

## 2024-02-27 PROCEDURE — 1036F TOBACCO NON-USER: CPT | Performed by: INTERNAL MEDICINE

## 2024-02-27 PROCEDURE — 99213 OFFICE O/P EST LOW 20 MIN: CPT | Performed by: INTERNAL MEDICINE

## 2024-02-27 ASSESSMENT — ENCOUNTER SYMPTOMS
DEPRESSION: 1
DYSPNEA ON EXERTION: 1
OCCASIONAL FEELINGS OF UNSTEADINESS: 1
LOSS OF SENSATION IN FEET: 0

## 2024-02-27 NOTE — PATIENT INSTRUCTIONS
Continue all current medications as prescribed.   Dr. Barnes has ordered a stress test to ensure your heart is getting adequate blood flow and there is no evidence of any blockages within the heart arteries.    Dr. Barnes has also ordered an echocardiogram (ultrasound of the heart) to followup on your heart function and structure.   Followup with Dr. Barnes after the above tests.    If you have any questions or cardiac concerns, please call our office at [phone].

## 2024-02-27 NOTE — LETTER
February 27, 2024     Elsie Quiles MD  [address] [place] [address]    Patient: Luciana Figueroa   YOB: 1955   Date of Visit: 2/27/2024       Dear Dr. Elsie Quiles MD:    Thank you for referring Luciana Figueroa to me for evaluation. Below are my notes for this consultation.  If you have questions, please do not hesitate to call me. I look forward to following your patient along with you.       Sincerely,     Osvaldo Barnes MD      CC: No Recipients  ______________________________________________________________________________________    Counseling:  The patient was counseled regarding diagnostic results, instructions for management, risk factor reductions, prognosis, patient and family education, impressions, risks and benefits of treatment options and importance of compliance with treatment.      Chief Complaint:    The patient presents today for annual followup of exertional SOB.    History Of Present Illness:    Luciana Figueroa is a 68 year old female patient who presents today for annual followup of exertional SOB. Her PMH is significant for asthma, hypersomnia with GARY, anxiety, GERD, and hyperlipidemia. Over the past year, the patient states that she has done well from a cardiac standpoint. She reports persistent exertional SOB, although stable and at baseline. She denies any chest pain or discomfort. BP has been stable. EKG today is stable with no acute changes. The patient is compliant with her prescribed medications.     Last Recorded Vitals:  Vitals:    02/27/24 1320   BP: 118/88   BP Location: Left arm   Pulse: 67   Weight: 87.1 kg (192 lb)   Height: 1.524 m (5')       Past Surgical History:  She has a past surgical history that includes Other surgical history (07/21/2020).      Social History:  She reports that she has never smoked. She has never used smokeless tobacco. She reports current alcohol use. She reports that she does not use drugs.    Family History:  Family  History   Problem Relation Name Age of Onset   • Heart failure Mother     • Coronary artery disease Father     • Lung cancer Brother     • Breast cancer Father's Sister          Allergies:  Patient has no known allergies.    Outpatient Medications:  Current Outpatient Medications   Medication Instructions   • acetaminophen (TylenoL) 325 mg tablet oral   • busPIRone (BUSPAR) 15 mg, oral, Every 12 hours   • carvedilol (Coreg) 25 mg tablet 1 tablet, oral, 2 times daily with meals   • dicyclomine (Bentyl) 10 mg capsule oral   • famotidine (PEPCID) 20 mg, oral, Daily RT   • ferrous gluconate (Fergon) 324 (38 Fe) mg tablet 38 mg of iron, oral, Nightly   • hydroCHLOROthiazide (HYDRODiuril) 25 mg tablet 1 tablet, oral, Daily   • levalbuterol (Xopenex HFA) 45 mcg/actuation inhaler 2 puffs, inhalation, Every 4 hours PRN   • montelukast (SINGULAIR) 10 mg, oral, Nightly   • pantoprazole (PROTONIX) 40 mg, oral, Daily   • rosuvastatin (Crestor) 20 mg tablet Every 24 hours   • sertraline (Zoloft) 100 mg tablet 1.5 tablets, oral, Daily   • tiZANidine (Zanaflex) 4 mg tablet 1 tablet, oral, 3 times daily PRN   • vibegron (Gemtesa) 75 mg tablet oral     Review of Systems   Cardiovascular:  Positive for dyspnea on exertion.   All other systems reviewed and are negative.     Physical Exam:  Constitutional:       Appearance: Healthy appearance. Not in distress.   Neck:      Vascular: No JVR. JVD normal.   Pulmonary:      Effort: Pulmonary effort is normal.      Breath sounds: Normal breath sounds. No wheezing. No rhonchi. No rales.   Chest:      Chest wall: Not tender to palpatation.   Cardiovascular:      PMI at left midclavicular line. Normal rate. Regular rhythm. Normal S1. Normal S2.       Murmurs: There is no murmur.      No gallop.  No click. No rub.   Pulses:     Intact distal pulses.   Edema:     Peripheral edema absent.   Abdominal:      General: Bowel sounds are normal.      Palpations: Abdomen is soft.      Tenderness: There  is no abdominal tenderness.   Musculoskeletal: Normal range of motion.         General: No tenderness. Skin:     General: Skin is warm and dry.   Neurological:      General: No focal deficit present.      Mental Status: Alert and oriented to person, place and time.          Last Labs:  CBC -  Lab Results   Component Value Date    WBC 7.9 05/03/2022    HGB 14.4 05/03/2022    HCT 43.8 05/03/2022    MCV 90 05/03/2022     05/03/2022       CMP -  Lab Results   Component Value Date    CALCIUM 9.6 05/03/2022    PROT 7.3 05/03/2022    ALBUMIN 4.2 05/03/2022    AST 15 05/03/2022    ALT 15 05/03/2022    ALKPHOS 69 05/03/2022    BILITOT 0.4 05/03/2022       RENAL FUNCTION PANEL -   Lab Results   Component Value Date    GLUCOSE 88 05/03/2022     05/03/2022    K 3.4 (L) 05/03/2022     05/03/2022    CO2 28 05/03/2022    ANIONGAP 11 05/03/2022    BUN 21 05/03/2022    CREATININE 0.72 05/03/2022    CALCIUM 9.6 05/03/2022    ALBUMIN 4.2 05/03/2022        Lab Results   Component Value Date    BNP 28 10/26/2020       Last Cardiology Tests:  10/21/2020 - CT Chest w/Contrast  1. Bilateral patchy opacities consistent with active pneumonitis/pneumonia. This can be seen with COVID-19 infection.  2. Stable 1 cm smooth nonspecific noncalcified right lower lobe pulmonary nodule compared to 04/06/2020 with no other older studies for comparison.     04/06/2020 - TTE  1. The left ventricular systolic function is normal with a 60% estimated ejection fraction.  2. Spectral Doppler shows an impaired relaxation pattern of left ventricular diastolic filling.  3. RVSP within normal limits.  4. Aortic valve stenosis is not present.     04/06/2020 - CT Angio Chest for PE  1. No evidence of pulmonary embolism.  2. Right lower lobe 1.1 cm solitary pulmonary nodule. It is stable when compared to short-term prior imaging, CT abdomen and pelvis from November 2019; however, there is no long-term imaging available for comparison. Possible  considerations include PET-CT and/or follow-up chest CT in 3-6 months.  3. No evidence of any enlarged mediastinal or hilar lymph nodes.     03/09/2020 - CT Chest w/o Contrast   Clear lungs. No acute process.     01/27/2020 - Cardiac Catheterization (LH)  No evidence of significant coronary artery disease.     01/08/2020 - Exercise Stress Test  1. Abnormal stress test due to chest pain and ventricular ectopy.  2. The inadequate level of stress was achieved. The patient achieved 1:36 minutes with a MPHR 89%  3. Very poor exercise capacity.  4. Consider additional cardiac evaluation.     Assessment/Plan  1) Exertional SOB   Continue carvedilol 25 mg BID, HCTZ 25 mg daily, rosuvastatin 20 mg daily  LHC negative in Jan. 2020  Negative echocardiogram April 2020  No PE by CT chest April 2020  Repeat CT chest 04/2021 with bronchitis and stable right lower lobe pulmonary nodule - follows with pulmonology  Persistent exertional SOB - stable and at baseline  Denies CP or discomfort  EKG stable  BP stable  Check echo  Check Lexiscan Cardiolite stress test - unable to tolerate treadmill stress s/t poor functional capacity     2) Lung Nodule  Followed by Pulmonary   CT chest 04/2021 with stable RLL pulmonary nodule   CT chest 03/2023 with stable findings     3) GARY  Management per pulmonary  Does not tolerate BiPAP/CPAP      Scribe Attestation  By signing my name below, IJanay Scribe   attest that this documentation has been prepared under the direction and in the presence of Osvaldo Barnes MD.

## 2024-03-15 DIAGNOSIS — J45.909 ASTHMA, UNSPECIFIED ASTHMA SEVERITY, UNSPECIFIED WHETHER COMPLICATED, UNSPECIFIED WHETHER PERSISTENT (HHS-HCC): Primary | ICD-10-CM

## 2024-03-15 RX ORDER — LEVALBUTEROL TARTRATE 45 UG/1
2 AEROSOL, METERED ORAL EVERY 4 HOURS PRN
Qty: 15 G | Refills: 11 | Status: SHIPPED | OUTPATIENT
Start: 2024-03-15 | End: 2024-04-14

## 2024-03-22 ENCOUNTER — HOSPITAL ENCOUNTER (OUTPATIENT)
Dept: CARDIOLOGY | Facility: HOSPITAL | Age: 69
Discharge: HOME | End: 2024-03-22
Payer: MEDICARE

## 2024-03-22 ENCOUNTER — HOSPITAL ENCOUNTER (OUTPATIENT)
Dept: RADIOLOGY | Facility: HOSPITAL | Age: 69
Discharge: HOME | End: 2024-03-22
Payer: MEDICARE

## 2024-03-22 DIAGNOSIS — R06.02 SHORTNESS OF BREATH ON EXERTION: ICD-10-CM

## 2024-03-22 DIAGNOSIS — R06.00 DYSPNEA, UNSPECIFIED: ICD-10-CM

## 2024-03-22 LAB
EJECTION FRACTION APICAL 4 CHAMBER: 60.7
EJECTION FRACTION: 66 %
LEFT ATRIUM VOLUME AREA LENGTH INDEX BSA: 19.8 ML/M2
LEFT VENTRICLE INTERNAL DIMENSION DIASTOLE: 4.53 CM (ref 3.5–6)
LEFT VENTRICULAR OUTFLOW TRACT DIAMETER: 1.95 CM
MITRAL VALVE E/A RATIO: 1.17
MITRAL VALVE E/E' RATIO: 10.45
RIGHT VENTRICLE FREE WALL PEAK S': 10 CM/S
TRICUSPID ANNULAR PLANE SYSTOLIC EXCURSION: 1.8 CM

## 2024-03-22 PROCEDURE — 78452 HT MUSCLE IMAGE SPECT MULT: CPT | Performed by: STUDENT IN AN ORGANIZED HEALTH CARE EDUCATION/TRAINING PROGRAM

## 2024-03-22 PROCEDURE — 2500000004 HC RX 250 GENERAL PHARMACY W/ HCPCS (ALT 636 FOR OP/ED): Performed by: STUDENT IN AN ORGANIZED HEALTH CARE EDUCATION/TRAINING PROGRAM

## 2024-03-22 PROCEDURE — 93306 TTE W/DOPPLER COMPLETE: CPT | Performed by: STUDENT IN AN ORGANIZED HEALTH CARE EDUCATION/TRAINING PROGRAM

## 2024-03-22 PROCEDURE — 93017 CV STRESS TEST TRACING ONLY: CPT

## 2024-03-22 PROCEDURE — 78452 HT MUSCLE IMAGE SPECT MULT: CPT

## 2024-03-22 PROCEDURE — 93306 TTE W/DOPPLER COMPLETE: CPT

## 2024-03-22 RX ORDER — REGADENOSON 0.08 MG/ML
0.4 INJECTION, SOLUTION INTRAVENOUS ONCE
Status: COMPLETED | OUTPATIENT
Start: 2024-03-22 | End: 2024-03-22

## 2024-03-22 RX ADMIN — REGADENOSON 0.4 MG: 0.08 INJECTION, SOLUTION INTRAVENOUS at 09:12

## 2024-03-22 RX ADMIN — HUMAN ALBUMIN MICROSPHERES AND PERFLUTREN 0.5 ML: 10; .22 INJECTION, SOLUTION INTRAVENOUS at 10:15

## 2024-03-25 PROBLEM — D21.9 LEIOMYOMA: Status: ACTIVE | Noted: 2023-10-30

## 2024-03-25 PROBLEM — D64.9 ANEMIA: Status: ACTIVE | Noted: 2023-03-31

## 2024-03-25 PROBLEM — F99 MENTAL DISORDER, NOT OTHERWISE SPECIFIED: Status: ACTIVE | Noted: 2023-06-23

## 2024-03-25 PROBLEM — R19.7 DIARRHEA: Status: ACTIVE | Noted: 2024-03-25

## 2024-03-25 PROBLEM — R11.2 NAUSEA AND VOMITING: Status: ACTIVE | Noted: 2024-03-25

## 2024-03-25 PROBLEM — K25.9 GASTRIC ULCER: Status: ACTIVE | Noted: 2024-03-25

## 2024-03-25 PROBLEM — E66.01 MORBID OBESITY (MULTI): Status: ACTIVE | Noted: 2024-03-25

## 2024-03-25 PROBLEM — Z86.79 HISTORY OF HYPERTENSION: Status: ACTIVE | Noted: 2024-03-25

## 2024-03-25 PROBLEM — M62.830 SPASM OF BACK MUSCLES: Status: ACTIVE | Noted: 2024-03-25

## 2024-03-25 PROBLEM — J30.9 ALLERGIC RHINITIS: Status: ACTIVE | Noted: 2023-03-31

## 2024-03-25 PROBLEM — D50.9 IRON DEFICIENCY ANEMIA: Status: ACTIVE | Noted: 2024-03-25

## 2024-03-25 PROBLEM — R07.9 CHEST PAIN: Status: ACTIVE | Noted: 2024-03-25

## 2024-03-25 RX ORDER — FERROUS SULFATE 325(65) MG
TABLET, DELAYED RELEASE (ENTERIC COATED) ORAL
COMMUNITY
Start: 2021-04-30

## 2024-03-25 RX ORDER — FLUTICASONE PROPIONATE 220 UG/1
1 AEROSOL, METERED RESPIRATORY (INHALATION)
COMMUNITY
Start: 2021-01-05

## 2024-03-25 RX ORDER — CYCLOBENZAPRINE HCL 10 MG
10 TABLET ORAL
COMMUNITY
Start: 2019-10-17

## 2024-03-25 RX ORDER — FLUTICASONE FUROATE AND VILANTEROL 100; 25 UG/1; UG/1
POWDER RESPIRATORY (INHALATION)
COMMUNITY
Start: 2022-05-17

## 2024-03-25 RX ORDER — OMEPRAZOLE 40 MG/1
40 CAPSULE, DELAYED RELEASE ORAL
COMMUNITY
Start: 2020-07-21

## 2024-03-31 PROBLEM — Z87.11 PERSONAL HISTORY OF PEPTIC ULCER DISEASE: Status: ACTIVE | Noted: 2024-03-31

## 2024-04-01 NOTE — PROGRESS NOTES
Counseling:  The patient was counseled regarding diagnostic results, instructions for management, risk factor reductions, prognosis, patient and family education, impressions, risks and benefits of treatment options and importance of compliance with treatment.      Chief Complaint:    The patient presents today for 5-week followup of exertional SOB s/p stress test and echocardiogram.    History Of Present Illness:    Luciana Figueroa is a 68 year old female patient who presents today for 5-week followup of exertional SOB s/p stress test and echocardiogram. Her PMH is significant for asthma, hypersomnia with GARY, anxiety, GERD, and hyperlipidemia. On 03/22/2024, echocardiogram demonstrated an EF of 55-60%, low normal RV systolic function and lipomatous hypertrophy of the atrial septum, and stress testing was negative for ischemia. The patient reports persistent exertional SOB, although stable from prior. She denies any cough. BP has been stable. The patient remains compliant with her prescribed medications.     Last Recorded Vitals:  Vitals:    04/02/24 1425   BP: 128/68   BP Location: Left arm   Pulse: 82   Weight: 86.6 kg (191 lb)   Height: 1.524 m (5')       Past Surgical History:  She has a past surgical history that includes Other surgical history (07/21/2020).      Social History:  She reports that she has never smoked. She has never used smokeless tobacco. She reports current alcohol use. She reports that she does not use drugs.    Family History:  Family History   Problem Relation Name Age of Onset    Heart failure Mother      Coronary artery disease Father      Lung cancer Brother      Breast cancer Father's Sister          Allergies:  Patient has no known allergies.    Outpatient Medications:  Current Outpatient Medications   Medication Instructions    acetaminophen (TylenoL) 325 mg tablet oral    busPIRone (BUSPAR) 15 mg, oral, Every 12 hours    carvedilol (Coreg) 25 mg tablet 1 tablet, oral, 2 times daily  with meals    cyclobenzaprine (FLEXERIL) 10 mg, oral    dicyclomine (Bentyl) 10 mg capsule oral    famotidine (PEPCID) 20 mg, oral, Daily RT    ferrous gluconate (Fergon) 324 (38 Fe) mg tablet 38 mg of iron, oral, Nightly    ferrous sulfate 325 (65 Fe) MG EC tablet oral, Daily RT    fluticasone (Flovent) 220 mcg/actuation inhaler 1 puff, inhalation, 2 times daily RT    fluticasone furoate-vilanteroL (Breo Ellipta) 100-25 mcg/dose inhaler inhalation, Daily RT    hydroCHLOROthiazide (HYDRODiuril) 25 mg tablet 1 tablet, oral, Daily    levalbuterol (Xopenex HFA) 45 mcg/actuation inhaler 2 puffs, inhalation, Every 4 hours PRN    mometasone 220 mcg/ actuation (120) aerosol powdr breath activated inhalation, Every 12 hours    montelukast (SINGULAIR) 10 mg, oral, Nightly    omeprazole (PRILOSEC) 40 mg, oral    pantoprazole (PROTONIX) 40 mg, oral, Daily    rosuvastatin (Crestor) 20 mg tablet Every 24 hours    sertraline (Zoloft) 100 mg tablet 1.5 tablets, oral, Daily    tiZANidine (Zanaflex) 4 mg tablet 1 tablet, oral, 3 times daily PRN    vibegron (Gemtesa) 75 mg tablet oral     Review of Systems   Respiratory:  Positive for shortness of breath (exertional).    All other systems reviewed and are negative.     Physical Exam:  Constitutional:       Appearance: Healthy appearance. Not in distress.   Neck:      Vascular: No JVR. JVD normal.   Pulmonary:      Effort: Pulmonary effort is normal.      Breath sounds: Normal breath sounds. No wheezing. No rhonchi. No rales.   Chest:      Chest wall: Not tender to palpatation.   Cardiovascular:      PMI at left midclavicular line. Normal rate. Regular rhythm. Normal S1. Normal S2.       Murmurs: There is no murmur.      No gallop.  No click. No rub.   Pulses:     Intact distal pulses.   Edema:     Peripheral edema absent.   Abdominal:      General: Bowel sounds are normal.      Palpations: Abdomen is soft.      Tenderness: There is no abdominal tenderness.   Musculoskeletal: Normal  range of motion.         General: No tenderness. Skin:     General: Skin is warm and dry.   Neurological:      General: No focal deficit present.      Mental Status: Alert and oriented to person, place and time.          Last Labs:  CBC -  Lab Results   Component Value Date    WBC 7.9 05/03/2022    HGB 14.4 05/03/2022    HCT 43.8 05/03/2022    MCV 90 05/03/2022     05/03/2022       CMP -  Lab Results   Component Value Date    CALCIUM 9.6 05/03/2022    PROT 7.3 05/03/2022    ALBUMIN 4.2 05/03/2022    AST 15 05/03/2022    ALT 15 05/03/2022    ALKPHOS 69 05/03/2022    BILITOT 0.4 05/03/2022       RENAL FUNCTION PANEL -   Lab Results   Component Value Date    GLUCOSE 88 05/03/2022     05/03/2022    K 3.4 (L) 05/03/2022     05/03/2022    CO2 28 05/03/2022    ANIONGAP 11 05/03/2022    BUN 21 05/03/2022    CREATININE 0.72 05/03/2022    CALCIUM 9.6 05/03/2022    ALBUMIN 4.2 05/03/2022        Lab Results   Component Value Date    BNP 28 10/26/2020       Last Cardiology Tests:  03/22/2024 - TTE  1. Left ventricular systolic function is normal with a 55-60% estimated ejection fraction.  2. Spectral Doppler shows an abnormal pattern of left ventricular diastolic filling.  3. There is low normal right ventricular systolic function.  4. Aortic valve stenosis is not present.  5. Lipomatous hypertrophy of the atrial septum.    03/22/2024 - Stress Test  1. SPECT stress myocardial perfusion imaging in response to pharmacologic stress demonstrate no evidence of reversible ischemia or infarction. Well-maintained left ventricular function with an LV ejection fraction of 80%.  2. No ECG changes from baseline. No clinical or electrocardiographic evidence for ischemia at maximal infusion.    10/21/2020 - CT Chest w/Contrast  1. Bilateral patchy opacities consistent with active pneumonitis/pneumonia. This can be seen with COVID-19 infection.  2. Stable 1 cm smooth nonspecific noncalcified right lower lobe pulmonary nodule  compared to 04/06/2020 with no other older studies for comparison.     04/06/2020 - TTE  1. The left ventricular systolic function is normal with a 60% estimated ejection fraction.  2. Spectral Doppler shows an impaired relaxation pattern of left ventricular diastolic filling.  3. RVSP within normal limits.  4. Aortic valve stenosis is not present.     04/06/2020 - CT Angio Chest for PE  1. No evidence of pulmonary embolism.  2. Right lower lobe 1.1 cm solitary pulmonary nodule. It is stable when compared to short-term prior imaging, CT abdomen and pelvis from November 2019; however, there is no long-term imaging available for comparison. Possible considerations include PET-CT and/or follow-up chest CT in 3-6 months.  3. No evidence of any enlarged mediastinal or hilar lymph nodes.     03/09/2020 - CT Chest w/o Contrast   Clear lungs. No acute process.     01/27/2020 - Cardiac Catheterization (LH)  No evidence of significant coronary artery disease.     01/08/2020 - Exercise Stress Test  1. Abnormal stress test due to chest pain and ventricular ectopy.  2. The inadequate level of stress was achieved. The patient achieved 1:36 minutes with a MPHR 89%  3. Very poor exercise capacity.  4. Consider additional cardiac evaluation.     Diagnostic review: I have personally reviewed the result(s) of the Echocardiogram and Stress Test.     Assessment/Plan   1) Exertional SOB   Continue carvedilol 25 mg BID, HCTZ 25 mg daily, rosuvastatin 20 mg daily  LHC negative in Jan. 2020  Negative echocardiogram April 2020  No PE by CT chest April 2020  Repeat CT chest 04/2021 with bronchitis and stable right lower lobe pulmonary nodule - follows with pulmonology  EKG stable  BP stable  TTE 03/22/2024 with LVEF 55-60%, lipomatous hypertrophy of the atrial septum  Stress 03/22/2024 negative for ischemia  Persistent exertional SOB - per patient, inhaler is no longer covered by insurance   Advised to followup with pulmonology to discuss  alternative inhaler   Check PFTs  F/U 1 year     2) Lung Nodule  Followed by Pulmonary   CT chest 04/2021 with stable RLL pulmonary nodule   CT chest 03/2023 with stable findings  Persistent exertional SOB with negative cardiac workup  Per patient, inhaler is no longer covered by insurance   Advised to followup with pulmonology to discuss alternative inhaler   Check PFTs     3) GARY  Management per pulmonary  Does not tolerate BiPAP/CPAP      Scribe Attestation  By signing my name below, I, Paulina Angulo   attest that this documentation has been prepared under the direction and in the presence of Osvaldo Barnes MD.

## 2024-04-02 ENCOUNTER — OFFICE VISIT (OUTPATIENT)
Dept: CARDIOLOGY | Facility: CLINIC | Age: 69
End: 2024-04-02
Payer: MEDICARE

## 2024-04-02 VITALS
WEIGHT: 191 LBS | DIASTOLIC BLOOD PRESSURE: 68 MMHG | BODY MASS INDEX: 37.5 KG/M2 | HEART RATE: 82 BPM | HEIGHT: 60 IN | SYSTOLIC BLOOD PRESSURE: 128 MMHG

## 2024-04-02 DIAGNOSIS — R06.02 SHORTNESS OF BREATH ON EXERTION: ICD-10-CM

## 2024-04-02 PROCEDURE — 1160F RVW MEDS BY RX/DR IN RCRD: CPT | Performed by: INTERNAL MEDICINE

## 2024-04-02 PROCEDURE — 1159F MED LIST DOCD IN RCRD: CPT | Performed by: INTERNAL MEDICINE

## 2024-04-02 PROCEDURE — 1036F TOBACCO NON-USER: CPT | Performed by: INTERNAL MEDICINE

## 2024-04-02 PROCEDURE — 99213 OFFICE O/P EST LOW 20 MIN: CPT | Performed by: INTERNAL MEDICINE

## 2024-04-02 ASSESSMENT — ENCOUNTER SYMPTOMS
LOSS OF SENSATION IN FEET: 0
OCCASIONAL FEELINGS OF UNSTEADINESS: 1
SHORTNESS OF BREATH: 1

## 2024-04-02 NOTE — LETTER
April 2, 2024     Elsie Quiles MD  [address]  [facility] [address]    Patient: Luciana Figueroa   YOB: 1955   Date of Visit: 4/2/2024       Dear Dr. Elsie Quiles MD:    Thank you for referring Luciana Figueroa to me for evaluation. Below are my notes for this consultation.  If you have questions, please do not hesitate to call me. I look forward to following your patient along with you.       Sincerely,     Osvaldo Barnes MD      CC: No Recipients  ______________________________________________________________________________________    Counseling:  The patient was counseled regarding diagnostic results, instructions for management, risk factor reductions, prognosis, patient and family education, impressions, risks and benefits of treatment options and importance of compliance with treatment.      Chief Complaint:    The patient presents today for 5-week followup of exertional SOB s/p stress test and echocardiogram.    History Of Present Illness:    Luciana Figueroa is a 68 year old female patient who presents today for 5-week followup of exertional SOB s/p stress test and echocardiogram. Her PMH is significant for asthma, hypersomnia with GARY, anxiety, GERD, and hyperlipidemia. On 03/22/2024, echocardiogram demonstrated an EF of 55-60%, low normal RV systolic function and lipomatous hypertrophy of the atrial septum, and stress testing was negative for ischemia. The patient reports persistent exertional SOB, although stable from prior. She denies any cough. BP has been stable. The patient remains compliant with her prescribed medications.     Last Recorded Vitals:  Vitals:    04/02/24 1425   BP: 128/68   BP Location: Left arm   Pulse: 82   Weight: 86.6 kg (191 lb)   Height: 1.524 m (5')       Past Surgical History:  She has a past surgical history that includes Other surgical history (07/21/2020).      Social History:  She reports that she has never smoked. She has never used smokeless  tobacco. She reports current alcohol use. She reports that she does not use drugs.    Family History:  Family History   Problem Relation Name Age of Onset   • Heart failure Mother     • Coronary artery disease Father     • Lung cancer Brother     • Breast cancer Father's Sister          Allergies:  Patient has no known allergies.    Outpatient Medications:  Current Outpatient Medications   Medication Instructions   • acetaminophen (TylenoL) 325 mg tablet oral   • busPIRone (BUSPAR) 15 mg, oral, Every 12 hours   • carvedilol (Coreg) 25 mg tablet 1 tablet, oral, 2 times daily with meals   • cyclobenzaprine (FLEXERIL) 10 mg, oral   • dicyclomine (Bentyl) 10 mg capsule oral   • famotidine (PEPCID) 20 mg, oral, Daily RT   • ferrous gluconate (Fergon) 324 (38 Fe) mg tablet 38 mg of iron, oral, Nightly   • ferrous sulfate 325 (65 Fe) MG EC tablet oral, Daily RT   • fluticasone (Flovent) 220 mcg/actuation inhaler 1 puff, inhalation, 2 times daily RT   • fluticasone furoate-vilanteroL (Breo Ellipta) 100-25 mcg/dose inhaler inhalation, Daily RT   • hydroCHLOROthiazide (HYDRODiuril) 25 mg tablet 1 tablet, oral, Daily   • levalbuterol (Xopenex HFA) 45 mcg/actuation inhaler 2 puffs, inhalation, Every 4 hours PRN   • mometasone 220 mcg/ actuation (120) aerosol powdr breath activated inhalation, Every 12 hours   • montelukast (SINGULAIR) 10 mg, oral, Nightly   • omeprazole (PRILOSEC) 40 mg, oral   • pantoprazole (PROTONIX) 40 mg, oral, Daily   • rosuvastatin (Crestor) 20 mg tablet Every 24 hours   • sertraline (Zoloft) 100 mg tablet 1.5 tablets, oral, Daily   • tiZANidine (Zanaflex) 4 mg tablet 1 tablet, oral, 3 times daily PRN   • vibegron (Gemtesa) 75 mg tablet oral     Review of Systems   Respiratory:  Positive for shortness of breath (exertional).    All other systems reviewed and are negative.     Physical Exam:  Constitutional:       Appearance: Healthy appearance. Not in distress.   Neck:      Vascular: No JVR. JVD normal.    Pulmonary:      Effort: Pulmonary effort is normal.      Breath sounds: Normal breath sounds. No wheezing. No rhonchi. No rales.   Chest:      Chest wall: Not tender to palpatation.   Cardiovascular:      PMI at left midclavicular line. Normal rate. Regular rhythm. Normal S1. Normal S2.       Murmurs: There is no murmur.      No gallop.  No click. No rub.   Pulses:     Intact distal pulses.   Edema:     Peripheral edema absent.   Abdominal:      General: Bowel sounds are normal.      Palpations: Abdomen is soft.      Tenderness: There is no abdominal tenderness.   Musculoskeletal: Normal range of motion.         General: No tenderness. Skin:     General: Skin is warm and dry.   Neurological:      General: No focal deficit present.      Mental Status: Alert and oriented to person, place and time.          Last Labs:  CBC -  Lab Results   Component Value Date    WBC 7.9 05/03/2022    HGB 14.4 05/03/2022    HCT 43.8 05/03/2022    MCV 90 05/03/2022     05/03/2022       CMP -  Lab Results   Component Value Date    CALCIUM 9.6 05/03/2022    PROT 7.3 05/03/2022    ALBUMIN 4.2 05/03/2022    AST 15 05/03/2022    ALT 15 05/03/2022    ALKPHOS 69 05/03/2022    BILITOT 0.4 05/03/2022       RENAL FUNCTION PANEL -   Lab Results   Component Value Date    GLUCOSE 88 05/03/2022     05/03/2022    K 3.4 (L) 05/03/2022     05/03/2022    CO2 28 05/03/2022    ANIONGAP 11 05/03/2022    BUN 21 05/03/2022    CREATININE 0.72 05/03/2022    CALCIUM 9.6 05/03/2022    ALBUMIN 4.2 05/03/2022        Lab Results   Component Value Date    BNP 28 10/26/2020       Last Cardiology Tests:  03/22/2024 - TTE  1. Left ventricular systolic function is normal with a 55-60% estimated ejection fraction.  2. Spectral Doppler shows an abnormal pattern of left ventricular diastolic filling.  3. There is low normal right ventricular systolic function.  4. Aortic valve stenosis is not present.  5. Lipomatous hypertrophy of the atrial  septum.    03/22/2024 - Stress Test  1. SPECT stress myocardial perfusion imaging in response to pharmacologic stress demonstrate no evidence of reversible ischemia or infarction. Well-maintained left ventricular function with an LV ejection fraction of 80%.  2. No ECG changes from baseline. No clinical or electrocardiographic evidence for ischemia at maximal infusion.    10/21/2020 - CT Chest w/Contrast  1. Bilateral patchy opacities consistent with active pneumonitis/pneumonia. This can be seen with COVID-19 infection.  2. Stable 1 cm smooth nonspecific noncalcified right lower lobe pulmonary nodule compared to 04/06/2020 with no other older studies for comparison.     04/06/2020 - TTE  1. The left ventricular systolic function is normal with a 60% estimated ejection fraction.  2. Spectral Doppler shows an impaired relaxation pattern of left ventricular diastolic filling.  3. RVSP within normal limits.  4. Aortic valve stenosis is not present.     04/06/2020 - CT Angio Chest for PE  1. No evidence of pulmonary embolism.  2. Right lower lobe 1.1 cm solitary pulmonary nodule. It is stable when compared to short-term prior imaging, CT abdomen and pelvis from November 2019; however, there is no long-term imaging available for comparison. Possible considerations include PET-CT and/or follow-up chest CT in 3-6 months.  3. No evidence of any enlarged mediastinal or hilar lymph nodes.     03/09/2020 - CT Chest w/o Contrast   Clear lungs. No acute process.     01/27/2020 - Cardiac Catheterization (LH)  No evidence of significant coronary artery disease.     01/08/2020 - Exercise Stress Test  1. Abnormal stress test due to chest pain and ventricular ectopy.  2. The inadequate level of stress was achieved. The patient achieved 1:36 minutes with a MPHR 89%  3. Very poor exercise capacity.  4. Consider additional cardiac evaluation.     Diagnostic review: I have personally reviewed the result(s) of the Echocardiogram and  Stress Test.     Assessment/Plan  1) Exertional SOB   Continue carvedilol 25 mg BID, HCTZ 25 mg daily, rosuvastatin 20 mg daily  LHC negative in Jan. 2020  Negative echocardiogram April 2020  No PE by CT chest April 2020  Repeat CT chest 04/2021 with bronchitis and stable right lower lobe pulmonary nodule - follows with pulmonology  EKG stable  BP stable  TTE 03/22/2024 with LVEF 55-60%, lipomatous hypertrophy of the atrial septum  Stress 03/22/2024 negative for ischemia  Persistent exertional SOB - per patient, inhaler is no longer covered by insurance   Advised to followup with pulmonology to discuss alternative inhaler   Check PFTs  F/U 1 year     2) Lung Nodule  Followed by Pulmonary   CT chest 04/2021 with stable RLL pulmonary nodule   CT chest 03/2023 with stable findings  Persistent exertional SOB with negative cardiac workup  Per patient, inhaler is no longer covered by insurance   Advised to followup with pulmonology to discuss alternative inhaler   Check PFTs     3) GARY  Management per pulmonary  Does not tolerate BiPAP/CPAP      Scribe Attestation  By signing my name below, I, Autumn Anugloibtran   attest that this documentation has been prepared under the direction and in the presence of Osvaldo Barnes MD.

## 2024-04-02 NOTE — PATIENT INSTRUCTIONS
Continue all current medications as prescribed.   For further evaluation of your shortness of breath, Dr. Barnes has ordered lung function testing. You will be notified of the results once they become available.  Dr. Barnes has recommended that you followup with Dr. Jackson to discuss an alternative inhaler that would be covered by your insurance.   Followup with Dr. Barnes in 1 year, sooner should any issues or concerns arise before then.     If you have any questions or cardiac concerns, please call our office at [phone].

## 2024-05-03 ENCOUNTER — HOSPITAL ENCOUNTER (OUTPATIENT)
Dept: RESPIRATORY THERAPY | Facility: HOSPITAL | Age: 69
Discharge: HOME | End: 2024-05-03
Payer: MEDICARE

## 2024-05-03 DIAGNOSIS — R06.02 SHORTNESS OF BREATH ON EXERTION: ICD-10-CM

## 2024-05-03 PROCEDURE — 94726 PLETHYSMOGRAPHY LUNG VOLUMES: CPT

## 2024-05-03 PROCEDURE — 94640 AIRWAY INHALATION TREATMENT: CPT

## 2024-05-03 PROCEDURE — 2500000001 HC RX 250 WO HCPCS SELF ADMINISTERED DRUGS (ALT 637 FOR MEDICARE OP): Performed by: INTERNAL MEDICINE

## 2024-05-03 RX ORDER — ALBUTEROL SULFATE 90 UG/1
4 AEROSOL, METERED RESPIRATORY (INHALATION) ONCE
Status: COMPLETED | OUTPATIENT
Start: 2024-05-03 | End: 2024-05-03

## 2024-05-03 RX ADMIN — ALBUTEROL SULFATE 4 PUFF: 90 AEROSOL, METERED RESPIRATORY (INHALATION) at 15:34

## 2024-05-03 ASSESSMENT — PAIN - FUNCTIONAL ASSESSMENT: PAIN_FUNCTIONAL_ASSESSMENT: 0-10

## 2024-05-03 ASSESSMENT — PAIN SCALES - GENERAL: PAINLEVEL_OUTOF10: 0 - NO PAIN

## 2024-05-06 LAB
MGC ASCENT PFT - FEV1 - POST: 1.79
MGC ASCENT PFT - FEV1 - PRE: 1.8
MGC ASCENT PFT - FEV1 - PREDICTED: 1.83
MGC ASCENT PFT - FVC - POST: 2
MGC ASCENT PFT - FVC - PRE: 2.29
MGC ASCENT PFT - FVC - PREDICTED: 2.31

## 2024-05-06 NOTE — PROGRESS NOTES
[facility]  Outpatient Clinic Note    Patient: Luciana Figueroa  Age/Sex: 68 y.o., female  MRN: [id number]  Referred by: Dr. Milligan     Chief Complaint:  3 month follow up         History of Present Illness  This is a 68 y.o. female,  who presents as a new patient to the clinic for medication management. She previously followed with Dr. Milligan for OAB. She currently takes Gemtesa and reports she is 80% better with the medication it has decreased her symptoms of urinary urgency, frequency and urge incontinence. She admits to RAMESH that is bothersome to her. She would like to discuss treatment options today. She denies dysuria, gross hematuria, flank pain, pelvic pain, vaginal bulging, fever or chills. The patient stated her bowel movements are regular and daily. She is not sexually active by choice and does not plan to be in the future. She had one vaginal birth. Denies a history of pelvic surfgeries, PMB. Denies a history of breast cancer.     Gyn History:  - Menopausal: No           Postmenopausal bleeding: No  - Hysterectomy: No  - Sexually active:  No  Dyspareunia: No   Other issues: none  - Number of prior vaginal deliveries: 1  - Number of prior c-sections: 0    - Mammogram up to date: No      Past Medical & Surgical History  Past Medical History:   Diagnosis Date    Anxiety disorder, unspecified     Anxiety and depression    Muscle spasm of back     Back spasm    Personal history of other diseases of the circulatory system     History of hypertension    Personal history of peptic ulcer disease 2020    History of gastric ulcer     Past Surgical History:   Procedure Laterality Date    OTHER SURGICAL HISTORY  2020    Colonoscopy       Family History  Family History   Problem Relation Name Age of Onset    Heart failure Mother      Coronary artery disease Father      Lung cancer Brother      Breast cancer Father's Sister         Social History  She reports that she has never smoked. She has  never used smokeless tobacco. She reports current alcohol use. She reports that she does not use drugs.    Allergies  Patient has no known allergies.    Medications:  Current Outpatient Medications on File Prior to Visit   Medication Sig Dispense Refill    acetaminophen (TylenoL) 325 mg tablet Take by mouth.      busPIRone (Buspar) 15 mg tablet Take 1 tablet (15 mg) by mouth every 12 hours.      carvedilol (Coreg) 25 mg tablet Take 1 tablet (25 mg) by mouth 2 times a day with meals.      cyclobenzaprine (Flexeril) 10 mg tablet Take 1 tablet (10 mg) by mouth.      dicyclomine (Bentyl) 10 mg capsule Take by mouth.      famotidine (Pepcid) 20 mg tablet Take 1 tablet (20 mg) by mouth once daily. 90 tablet 3    ferrous gluconate (Fergon) 324 (38 Fe) mg tablet Take 1 tablet (38 mg of iron) by mouth once daily at bedtime.      ferrous sulfate 325 (65 Fe) MG EC tablet Take by mouth once daily.      fluticasone (Flovent) 220 mcg/actuation inhaler Inhale 1 puff 2 times a day.      fluticasone furoate-vilanteroL (Breo Ellipta) 100-25 mcg/dose inhaler Inhale once daily.      hydroCHLOROthiazide (HYDRODiuril) 25 mg tablet Take 1 tablet (25 mg) by mouth once daily.      levalbuterol (Xopenex HFA) 45 mcg/actuation inhaler Inhale 2 puffs every 4 hours if needed for wheezing or shortness of breath. (Patient not taking: Reported on 4/2/2024) 15 g 11    mometasone 220 mcg/ actuation (120) aerosol powdr breath activated Inhale every 12 hours.      montelukast (Singulair) 10 mg tablet Take 1 tablet (10 mg) by mouth once daily at bedtime. (Patient not taking: Reported on 4/2/2024) 30 tablet 11    omeprazole (PriLOSEC) 40 mg DR capsule Take 1 capsule (40 mg) by mouth.      pantoprazole (ProtoNix) 40 mg EC tablet Take 1 tablet (40 mg) by mouth once daily. 90 tablet 3    rosuvastatin (Crestor) 20 mg tablet once every 24 hours.      sertraline (Zoloft) 100 mg tablet Take 1.5 tablets (150 mg) by mouth once daily.      tiZANidine (Zanaflex) 4  mg tablet Take 1 tablet (4 mg) by mouth 3 times a day as needed.      vibegron (Gemtesa) 75 mg tablet Take by mouth.       No current facility-administered medications on file prior to visit.        Review of Systems   A comprehensive 10+ review of systems was negative except for: see hpi          Physical Exam                                                                                                                      General: Well developed, well nourished, alert and cooperative, appears in no acute distress  Head: Normocephalic, atraumatic  Neck: supple, trachea midline  Eyes: Non-injected conjunctiva, sclera clear, no proptosis  Cardiac: Extremities are warm and well perfused. No edema, cyanosis or pallor.   Lungs: Breathing is easy, non-labored. Speaking in clear and complete sentences. Normal diaphragmatic movement.  Abdomen: soft, non-distended, non-tender, no rebound or guarding, no hernia and no CVA tenderness   MSK: Ambulatory with steady gait, unassisted  Neuro: alert and oriented to person, place and time  Psych: Demonstrates good judgement and reason, without hallucinations, abnormal affect or abnormal behaviors.  Skin: no obvious lesions, no rashes  Pelvic:  Genitourinary:  normal external genitalia, Bartholin's glands negative, Goshen's glands negative  Urethra   normal meatus, non-tender, no periurethral mass  Vaginal mucosa  normal  Cervix normal  Uterus normal size, nontender  Adnexae  negative nontender, no masses  Atrophy positive    CST negative  Pelvic floor muscle contraction  3/5    POP-stage 2    Rectal: deferred    PVR (by Ultrasound): 98mL   Urine dip:   Recent Results (from the past 6 hour(s))   POCT UA (nonautomated) manually resulted    Collection Time: 05/07/24  9:05 AM   Result Value Ref Range    POC Glucose, Urine NEGATIVE NEGATIVE mg/dl    POC Bilirubin, Urine NEGATIVE NEGATIVE    POC Ketones, Urine NEGATIVE NEGATIVE mg/dl    POC Specific Gravity, Urine 1.020 1.005 - 1.035     POC Blood, Urine TRACE-Intact (A) NEGATIVE    POC PH, Urine 6.0 No Reference Range Established PH    POC Protein, Urine NEGATIVE NEGATIVE, 30 (1+) mg/dl    POC Urobilinogen, Urine 0.2 0.2, 1.0 EU/DL    Poc Nitrite, Urine NEGATIVE NEGATIVE    POC Leukocytes, Urine TRACE (A) NEGATIVE       Labs  N/A    Imaging  N/A      IMPRESSION AND PLAN:  Luciana Figueroa is a 68 y.o. with KALEB, OAB and GSM.     KALEB: Urge dominant  -discussed mechanism of UUI and RAMESH, and treatment options for both including PFT, pessary, sling for RAMESH and PFT, pharmacotherapy and third-line therapy for OAB  -Patient has become very bothered by RAMESH  -Order UDS, negative CST in office  -[facility] following UDS to go over results and discuss treatment options  -Education handouts given to patient    OAB/UUI  -Doing well on Gemtesa  -we discussed botox vs sacral neuromodulation: both have similar efficacy 80% patients reports >50% improvement, botox associated with 5% risk of incomplete emptying, increase in UTI and will require re-injection in 6-9 months; and as early as 3 months. SNM is a staged procedure, 2 weeks apart, consisting first of lead implantation then internalization of IPG if there is improvement. Interstim is associated with lead migration, explantation, infection and bleeding, though risks are all <5%. We also discussed PTNS which is associated with success rates comparable to medical therapy but without side-effects without significant major morbidity.     GSM  -Vaginal estrogen    Bulkamid is associated with slightly less success rate of a sling about 60 to 70% of women having >90% improvement. However, there seems to be similar long-term success compared to sling with fewer side-effects. Main AE is urinary retention which resolves within 24 hours of using a 10-12 Ethiopian catheter.  I discussed that if she still has some leakage after her procedure, she could perform another injection within 4 weeks and this procedure  being performed in the office.     We discussed the sling procedure in depth with her there is a long-term success rate of 70-80% complete continence and up to 90% significant improvement up to 10 years after surgery, though the sling is meant to last a lifetime, there is a <5% risk of subsequent surgery, either revision or excision within 9 years. The major complications include bladder perforation with sling placement <1%, retention requiring sling lysis 1-3%, transient retention requiring 2-3 days of catheter drainage 33%, and mesh erosion 1-3%.       Follow up with appointment on 5/31 for UDS and appointment following with Dr. Negron, would like to discuss RAMESH options    All questions and concerns were answered and addressed.  The patient expressed understanding and agrees with the plan.     Reviewed and approved by MARY JO CHAHAL on 5/7/24 at 9:06 AM.

## 2024-05-07 ENCOUNTER — OFFICE VISIT (OUTPATIENT)
Dept: UROLOGY | Facility: CLINIC | Age: 69
End: 2024-05-07
Payer: MEDICARE

## 2024-05-07 VITALS — SYSTOLIC BLOOD PRESSURE: 134 MMHG | DIASTOLIC BLOOD PRESSURE: 81 MMHG | HEART RATE: 93 BPM

## 2024-05-07 DIAGNOSIS — N95.8 GENITOURINARY SYNDROME OF MENOPAUSE: ICD-10-CM

## 2024-05-07 DIAGNOSIS — N39.46 MIXED INCONTINENCE: ICD-10-CM

## 2024-05-07 DIAGNOSIS — N32.81 OVERACTIVE BLADDER: Primary | ICD-10-CM

## 2024-05-07 LAB
POC BILIRUBIN, URINE: NEGATIVE
POC BLOOD, URINE: ABNORMAL
POC GLUCOSE, URINE: NEGATIVE MG/DL
POC KETONES, URINE: NEGATIVE MG/DL
POC LEUKOCYTES, URINE: ABNORMAL
POC NITRITE,URINE: NEGATIVE
POC PH, URINE: 6 PH
POC PROTEIN, URINE: NEGATIVE MG/DL
POC SPECIFIC GRAVITY, URINE: 1.02
POC UROBILINOGEN, URINE: 0.2 EU/DL

## 2024-05-07 PROCEDURE — 81002 URINALYSIS NONAUTO W/O SCOPE: CPT

## 2024-05-07 PROCEDURE — 1160F RVW MEDS BY RX/DR IN RCRD: CPT

## 2024-05-07 PROCEDURE — 1036F TOBACCO NON-USER: CPT

## 2024-05-07 PROCEDURE — 51798 US URINE CAPACITY MEASURE: CPT

## 2024-05-07 PROCEDURE — 99204 OFFICE O/P NEW MOD 45 MIN: CPT

## 2024-05-07 PROCEDURE — 2000F BLOOD PRESSURE MEASURE: CPT

## 2024-05-07 PROCEDURE — 1159F MED LIST DOCD IN RCRD: CPT

## 2024-05-07 RX ORDER — VIBEGRON 75 MG/1
75 TABLET, FILM COATED ORAL NIGHTLY
Qty: 84 TABLET | Refills: 0 | COMMUNITY
Start: 2024-05-07 | End: 2024-07-30

## 2024-05-07 RX ORDER — ESTRADIOL 0.1 MG/G
CREAM VAGINAL
Qty: 42.5 G | Refills: 12 | Status: SHIPPED
Start: 2024-05-07 | End: 2024-05-14 | Stop reason: SDUPTHER

## 2024-05-14 DIAGNOSIS — N95.8 GENITOURINARY SYNDROME OF MENOPAUSE: ICD-10-CM

## 2024-05-14 RX ORDER — ESTRADIOL 0.1 MG/G
CREAM VAGINAL
Qty: 42.5 G | Refills: 12 | Status: SHIPPED | OUTPATIENT
Start: 2024-05-14

## 2024-05-17 DIAGNOSIS — F41.9 ANXIETY: ICD-10-CM

## 2024-05-17 DIAGNOSIS — F41.9 ANXIETY: Primary | ICD-10-CM

## 2024-05-17 RX ORDER — BUSPIRONE HYDROCHLORIDE 15 MG/1
15 TABLET ORAL EVERY 12 HOURS
Qty: 60 TABLET | Refills: 5 | Status: SHIPPED | OUTPATIENT
Start: 2024-05-17

## 2024-05-17 RX ORDER — BUSPIRONE HYDROCHLORIDE 15 MG/1
15 TABLET ORAL EVERY 12 HOURS
Qty: 60 TABLET | Refills: 5 | Status: SHIPPED
Start: 2024-05-17 | End: 2024-05-17 | Stop reason: SDUPTHER

## 2024-05-30 ENCOUNTER — TELEPHONE (OUTPATIENT)
Dept: GASTROENTEROLOGY | Facility: CLINIC | Age: 69
End: 2024-05-30
Payer: MEDICARE

## 2024-05-30 DIAGNOSIS — R10.9 ABDOMINAL CRAMPING: Primary | ICD-10-CM

## 2024-05-30 RX ORDER — DICYCLOMINE HYDROCHLORIDE 10 MG/1
10 CAPSULE ORAL 3 TIMES DAILY PRN
Qty: 60 CAPSULE | Refills: 0 | Status: SHIPPED | OUTPATIENT
Start: 2024-05-30

## 2024-05-31 ENCOUNTER — APPOINTMENT (OUTPATIENT)
Dept: UROLOGY | Facility: CLINIC | Age: 69
End: 2024-05-31
Payer: MEDICARE

## 2024-06-11 ENCOUNTER — PROCEDURE VISIT (OUTPATIENT)
Dept: UROLOGY | Facility: CLINIC | Age: 69
End: 2024-06-11
Payer: MEDICARE

## 2024-06-11 ENCOUNTER — OFFICE VISIT (OUTPATIENT)
Dept: UROLOGY | Facility: CLINIC | Age: 69
End: 2024-06-11
Payer: MEDICARE

## 2024-06-11 DIAGNOSIS — L28.0 LICHEN SIMPLEX CHRONICUS: ICD-10-CM

## 2024-06-11 DIAGNOSIS — N39.46 URGE AND STRESS INCONTINENCE: ICD-10-CM

## 2024-06-11 DIAGNOSIS — R10.2 FEMALE PELVIC PAIN: ICD-10-CM

## 2024-06-11 DIAGNOSIS — N39.3 SUI (STRESS URINARY INCONTINENCE, FEMALE): ICD-10-CM

## 2024-06-11 DIAGNOSIS — N76.0 VULVOVAGINITIS: Primary | ICD-10-CM

## 2024-06-11 DIAGNOSIS — N32.81 OAB (OVERACTIVE BLADDER): Primary | ICD-10-CM

## 2024-06-11 DIAGNOSIS — N95.8 GENITOURINARY SYNDROME OF MENOPAUSE: ICD-10-CM

## 2024-06-11 LAB
POC BILIRUBIN, URINE: ABNORMAL
POC BLOOD, URINE: ABNORMAL
POC GLUCOSE, URINE: NEGATIVE MG/DL
POC KETONES, URINE: ABNORMAL MG/DL
POC LEUKOCYTES, URINE: ABNORMAL
POC NITRITE,URINE: NEGATIVE
POC PH, URINE: 5.5 PH
POC PROTEIN, URINE: ABNORMAL MG/DL
POC SPECIFIC GRAVITY, URINE: 1.02
POC UROBILINOGEN, URINE: 0.2 EU/DL

## 2024-06-11 PROCEDURE — 99204 OFFICE O/P NEW MOD 45 MIN: CPT | Performed by: STUDENT IN AN ORGANIZED HEALTH CARE EDUCATION/TRAINING PROGRAM

## 2024-06-11 PROCEDURE — 51728 CYSTOMETROGRAM W/VP: CPT

## 2024-06-11 PROCEDURE — 51741 ELECTRO-UROFLOWMETRY FIRST: CPT

## 2024-06-11 PROCEDURE — 51729 CYSTOMETROGRAM W/VP&UP: CPT | Performed by: STUDENT IN AN ORGANIZED HEALTH CARE EDUCATION/TRAINING PROGRAM

## 2024-06-11 PROCEDURE — 51797 INTRAABDOMINAL PRESSURE TEST: CPT

## 2024-06-11 PROCEDURE — 51784 ANAL/URINARY MUSCLE STUDY: CPT

## 2024-06-11 PROCEDURE — 51784 ANAL/URINARY MUSCLE STUDY: CPT | Performed by: STUDENT IN AN ORGANIZED HEALTH CARE EDUCATION/TRAINING PROGRAM

## 2024-06-11 PROCEDURE — 81003 URINALYSIS AUTO W/O SCOPE: CPT

## 2024-06-11 PROCEDURE — 51797 INTRAABDOMINAL PRESSURE TEST: CPT | Performed by: STUDENT IN AN ORGANIZED HEALTH CARE EDUCATION/TRAINING PROGRAM

## 2024-06-11 PROCEDURE — 51741 ELECTRO-UROFLOWMETRY FIRST: CPT | Performed by: STUDENT IN AN ORGANIZED HEALTH CARE EDUCATION/TRAINING PROGRAM

## 2024-06-11 RX ORDER — CLOTRIMAZOLE AND BETAMETHASONE DIPROPIONATE 10; .64 MG/G; MG/G
1 CREAM TOPICAL 2 TIMES DAILY
Qty: 45 G | Refills: 2 | Status: SHIPPED | OUTPATIENT
Start: 2024-06-11 | End: 2024-07-09

## 2024-06-11 NOTE — PROGRESS NOTES
Urodynamic Study:  Luciana Figueroa identified using 2 forms of identification. A timeout was completed, patient is in the correct position and all safety precautions have been taken.   Risks, Benefits and Alternatives:  Risks, benefits and alternatives were discussed in detail. The patient appears to understand and agrees to proceed. Luciana Figueroa has completed, signed and dated the procedure consent form.    Uroflow completed.  Using sterile technique, a 7fr Air Charge Catheter was inserted into the bladder. Rectal catheter inserted approximately 15cm  Bladder filled with normal saline at a rate of 49.9ml/min 548.0  ml of normal saline instilled in bladder prior to voiding.   Results:  Post void residual  (PVR)  volume of 10 ml.      Patient here for urodynamic study. Discussed procedure. Bladder diary reviewed. Patient has complaints of having OAB symptoms however she started taking Gemtesa and these symptoms has improved by 80%. Patient did stop medications 3 days before study. She notes that her symptoms of urgency and frequency have returned while off meds. She is still having stress incontinence. Performed UDS without difficulty. Patient did leak with cough stress test today. Patient was unable to void full amount. Once sensor catheters were removed she was able to empty her bladder.  Instructed patient to increase fluid intake.

## 2024-06-11 NOTE — PROGRESS NOTES
HISTORY OF PRESENT ILLNESS:  Luciana Figueroa is a 68 y.o. female who presents today for a follow up visit to discuss her UDS results. She is doing well on Gemtesa, but she has leakage if she forgets to take the pill like she should.  Previously saw Dr. Milligan.  She had tried other medications.  Has a history of 1 vaginal birth.   also complains of severe stress incontinence.  No prior history of breast cancer or vaginal surgery.  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  UDS demonstrates normal emptying and stress incontinence.         Past Medical History  She has a past medical history of Anxiety disorder, unspecified, Muscle spasm of back, Personal history of other diseases of the circulatory system, and Personal history of peptic ulcer disease (12/21/2020).    Surgical History  She has a past surgical history that includes Other surgical history (07/21/2020).     Social History  She reports that she has never smoked. She has never used smokeless tobacco. She reports current alcohol use. She reports that she does not use drugs.    Family History  Family History   Problem Relation Name Age of Onset    Heart failure Mother      Coronary artery disease Father      Lung cancer Brother      Breast cancer Father's Sister          Allergies  Patient has no known allergies.      A comprehensive 10+ review of systems was negative except for: see hpi                          PHYSICAL EXAMINATION:  BP Readings from Last 3 Encounters:   05/07/24 134/81   04/02/24 128/68   02/27/24 118/88      Wt Readings from Last 3 Encounters:   04/02/24 86.6 kg (191 lb)   02/27/24 87.1 kg (192 lb)   12/08/23 88.6 kg (195 lb 4.8 oz)      BMI: Estimated body mass index is 37.3 kg/m² as calculated from the following:    Height as of 4/2/24: 1.524 m (5').    Weight as of 4/2/24: 86.6 kg (191 lb).  BSA: Estimated body surface area is 1.91 meters squared as calculated from the following:    Height as of 4/2/24: 1.524 m  (5').    Weight as of 4/2/24: 86.6 kg (191 lb).  HEENT: Normocephalic, atraumatic, PER EOMI, nonicteric, trachea normal, thyroid normal, oropharynx normal.  CARDIAC: regular rate & rhythm, S1 & S2 normal.  No heaves, thrills, gallops or murmurs.  LUNGS: Clear to auscultation, no spinal or CV tenderness.  EXTREMITIES: No evidence of cyanosis, clubbing or edema.    Stage 0 POP            Assessment:  Luciana Figueroa is a 68 y.o. with KALEB, OAB and GSM.      KALEB: Urge dominant  -discussed mechanism of UUI and RAMESH, and treatment options for both including PFT, pessary, sling for RAMESH and PFT, pharmacotherapy and third-line therapy for OAB    UDS demonstrates stress incontinence and normal emptying    Doing well on Gemtesa     she is equally bothered by both conditions    we discussed botox vs sacral neuromodulation: both have similar efficacy 80% patients reports >50% improvement, botox associated with 5% risk of incomplete emptying, increase in UTI and will require re-injection in 6-9 months; and as early as 3 months. SNM is a staged procedure, 2 weeks apart, consisting first of lead implantation then internalization of IPG if there is improvement. Interstim is associated with lead migration, explantation, infection and bleeding, though risks are all <5%. We also discussed PTNS which is associated with success rates comparable to medical therapy but without side-effects without significant major morbidity.       Informational handouts provided      Wants to try Botox first, 100 units every 6 months    GSM    -Stop vaginal estrogen it caused irritation with itching  -Lotrisone        Follow up for botox     All questions and concerns were answered and addressed.  The patient expressed understanding and agrees with the plan.     Gee Negron MD    Scribe Attestation  By signing my name below, ILara Scribe   attest that this documentation has been prepared under the direction and in the presence of Gee  MD Migdalia.

## 2024-06-18 ENCOUNTER — HOSPITAL ENCOUNTER (OUTPATIENT)
Dept: RADIOLOGY | Facility: CLINIC | Age: 69
Discharge: HOME | End: 2024-06-18
Payer: MEDICARE

## 2024-06-18 DIAGNOSIS — R09.02 HYPOXEMIA: ICD-10-CM

## 2024-06-18 PROCEDURE — 71048 X-RAY EXAM CHEST 4+ VIEWS: CPT | Performed by: RADIOLOGY

## 2024-06-18 PROCEDURE — 71048 X-RAY EXAM CHEST 4+ VIEWS: CPT

## 2024-07-30 ENCOUNTER — OFFICE VISIT (OUTPATIENT)
Dept: PULMONOLOGY | Facility: HOSPITAL | Age: 69
End: 2024-07-30
Payer: MEDICARE

## 2024-07-30 VITALS
HEIGHT: 60 IN | WEIGHT: 191 LBS | SYSTOLIC BLOOD PRESSURE: 136 MMHG | BODY MASS INDEX: 37.5 KG/M2 | OXYGEN SATURATION: 94 % | HEART RATE: 65 BPM | TEMPERATURE: 98.4 F | DIASTOLIC BLOOD PRESSURE: 82 MMHG | RESPIRATION RATE: 16 BRPM

## 2024-07-30 DIAGNOSIS — J45.40 MODERATE PERSISTENT ASTHMA WITHOUT COMPLICATION (HHS-HCC): Primary | ICD-10-CM

## 2024-07-30 DIAGNOSIS — E66.01 MORBID OBESITY (MULTI): ICD-10-CM

## 2024-07-30 DIAGNOSIS — F41.9 CHRONIC ANXIETY: ICD-10-CM

## 2024-07-30 DIAGNOSIS — R91.1 SOLITARY PULMONARY NODULE: ICD-10-CM

## 2024-07-30 DIAGNOSIS — G47.33 OSA (OBSTRUCTIVE SLEEP APNEA): ICD-10-CM

## 2024-07-30 DIAGNOSIS — J30.9 ALLERGIC RHINITIS, UNSPECIFIED SEASONALITY, UNSPECIFIED TRIGGER: ICD-10-CM

## 2024-07-30 PROCEDURE — 1036F TOBACCO NON-USER: CPT | Performed by: INTERNAL MEDICINE

## 2024-07-30 PROCEDURE — 3008F BODY MASS INDEX DOCD: CPT | Performed by: INTERNAL MEDICINE

## 2024-07-30 PROCEDURE — 99214 OFFICE O/P EST MOD 30 MIN: CPT | Performed by: INTERNAL MEDICINE

## 2024-07-30 PROCEDURE — 1159F MED LIST DOCD IN RCRD: CPT | Performed by: INTERNAL MEDICINE

## 2024-07-30 RX ORDER — ALBUTEROL SULFATE 90 UG/1
2 AEROSOL, METERED RESPIRATORY (INHALATION) EVERY 4 HOURS PRN
Qty: 13.4 G | Refills: 11 | Status: SHIPPED | OUTPATIENT
Start: 2024-07-30 | End: 2025-07-30

## 2024-07-30 RX ORDER — FLUTICASONE PROPIONATE AND SALMETEROL 500; 50 UG/1; UG/1
1 POWDER RESPIRATORY (INHALATION)
Qty: 60 EACH | Refills: 11 | Status: SHIPPED | OUTPATIENT
Start: 2024-07-30

## 2024-07-30 ASSESSMENT — COLUMBIA-SUICIDE SEVERITY RATING SCALE - C-SSRS
6. HAVE YOU EVER DONE ANYTHING, STARTED TO DO ANYTHING, OR PREPARED TO DO ANYTHING TO END YOUR LIFE?: NO
1. IN THE PAST MONTH, HAVE YOU WISHED YOU WERE DEAD OR WISHED YOU COULD GO TO SLEEP AND NOT WAKE UP?: NO
2. HAVE YOU ACTUALLY HAD ANY THOUGHTS OF KILLING YOURSELF?: NO

## 2024-07-30 ASSESSMENT — PATIENT HEALTH QUESTIONNAIRE - PHQ9
2. FEELING DOWN, DEPRESSED OR HOPELESS: NOT AT ALL
1. LITTLE INTEREST OR PLEASURE IN DOING THINGS: NOT AT ALL
SUM OF ALL RESPONSES TO PHQ9 QUESTIONS 1 AND 2: 0

## 2024-07-30 ASSESSMENT — ENCOUNTER SYMPTOMS
WHEEZING: 0
COUGH: 0
RHINORRHEA: 0
UNEXPECTED WEIGHT CHANGE: 0
FEVER: 0
SHORTNESS OF BREATH: 1
CHILLS: 0
FATIGUE: 0

## 2024-07-30 NOTE — PATIENT INSTRUCTIONS
1. Take Albuterol 2 puffs as needed for shortness of breath, wheezing or chest tightness and coughing spells. Please take 2 puffs about 10 minutes before exercise.   2. Continue buspirone 15 mg 1 tablet twice daily for anxiety.   3. Weight management as discussed. Establish a physical activity regimen. I am also referring you to weight management center.  4. Call with any questions or concerns. Please notify if any worsening of respiratory issues.   5. Please establish with Dietician for nutrition consult and also follow up at weight management center.   6. Complete CT chest prior to next visit.   7. Follow up with me in 3 months.

## 2024-07-30 NOTE — PROGRESS NOTES
Subjective   Patient ID: Luciana Figueroa is a 68 y.o. female who presents for asthma follow up.     Asthma  She complains of shortness of breath. There is no cough (denies hemoptysis.) or wheezing. Pertinent negatives include no chest pain, fever, postnasal drip or rhinorrhea. Her past medical history is significant for asthma.   : Patient has PMH of asthma, covid, lung nodule, allergic rhinitis, anxiety, depression, and HTN. She states that her breathing is stable and she only uses Xopenex with weather changes mostly. She states Montelukast helps improve s/s. She has GARY but noncompliant with CPAP.     She is here for follow up today. She states that her O2 tests are normal but at home her POX ranges from 88 to 92%. She notes SOB with activity and has remained at rest and activity same. She states when she eats she has to stop and breathe. She states she is not taking any inhalers because insurance quit paying for it since March 2024.     Review of Systems   Constitutional:  Negative for chills, fatigue, fever and unexpected weight change.   HENT:  Negative for congestion, postnasal drip and rhinorrhea.    Respiratory:  Positive for shortness of breath. Negative for cough (denies hemoptysis.) and wheezing.    Cardiovascular:  Negative for chest pain and leg swelling.   All other systems reviewed and are negative.      Objective   Physical Exam  Vitals reviewed.   Constitutional:       Appearance: Normal appearance.   HENT:      Head: Normocephalic.   Cardiovascular:      Rate and Rhythm: Normal rate and regular rhythm.   Pulmonary:      Effort: Pulmonary effort is normal.      Breath sounds: Normal breath sounds.   Skin:     General: Skin is warm and dry.   Neurological:      Mental Status: She is alert.       Assessment/Plan   1. Bronchial Asthma  2. Allergic rhinitis  3. Obstructive sleep apnea, noncompliant   4. Right lower lobe lung nodule stable since Dec 2019  5. Anxiety disorder  6. Obesity, BMI 37.3  (191)  7. Stage I diastolic dysfunction  8. Hypoxia, Obesity hypoventilation   9. GERD/Hiatal hernia and PUD     Plan:  -PFTs discussed 5/3/24: FEV1 98%, no BDR, normal volumes and DLCO normal.   -She was unable to afford Breo and has tried multiple other maintenance inhalers which did not help. Will continue on Xopenex prn.   -Normal ECHO and Stress Test March 2024.   -DC Montelukast HS as symptoms are better; denies postnasal drip and cough.  -CT chest for follow up on lung nodule is stable since April 2020.   -I recommend a psychiatry consult for her. Continue buspirone for anxiety at 15 mg BID.   -Weight loss as discussed. Recommend weight management consult.  -Cont pantoprazole in AM and Famotidine HS.   -HST discussed showing GARY that is severe she has been unable to tolerate CPAP due to anxiety. She states she is going to send CPAP back to DME. I discussed risks of untreated GARY at length. We discussed risk of cerebrovascular, cardiovascular and metabolic morbidity and mortality associated with untreated GARY. SHe states there is no way she will use CPAP. She has been evaluated by ENT for inspire and not deemed a candidate due to BMI >35, oral appliance not indicated and not interested in that. She will work on weight loss and sleep on her side.  -6MWT with exertional hypoxia of 91% but did not require O2 supplementation. Suspect this is secondary to OHS. Repeat 6MWT without need for O2 supplementation with activity with lowest SpO2 92%.  -Iron panel with Iron 61 and ferritin 19. Recommend iron supplementation also from RLS standpoint.   -Discussed weight loss program such as silver sneaker, increase physical activity regimen  -CT chest for follow up on RLL lung nodule 1.1 cm. Has a family hx of lung cancer.      Her dyspnea appears to be significantly related to her chronic anxiety, physical deconditioning from inactivity and weight gain with BMI now 37.3. Her PFTs are normal and cardiac work up is normal. We  have attempted optimization of Rx from Exercise Induced Asthma standpoint but has limitation to take inhalers due to cost. Overall, she has not shown improvement since she has not had weight loss or improvement in physical activity and moreover, she has not seen any mental health counselor now for anxiety management.      Follow up on CT chest and assess her response to Wixela and prn albuterol before exercising.

## 2024-08-09 ENCOUNTER — APPOINTMENT (OUTPATIENT)
Dept: NUTRITION | Facility: CLINIC | Age: 69
End: 2024-08-09
Payer: MEDICARE

## 2024-08-09 DIAGNOSIS — E78.5 HYPERLIPIDEMIA, UNSPECIFIED HYPERLIPIDEMIA TYPE: Primary | ICD-10-CM

## 2024-08-09 DIAGNOSIS — E66.01 MORBID OBESITY (MULTI): ICD-10-CM

## 2024-08-09 DIAGNOSIS — Z76.89 ENCOUNTER FOR WEIGHT MANAGEMENT: ICD-10-CM

## 2024-08-09 DIAGNOSIS — Z71.3 DIETARY COUNSELING AND SURVEILLANCE: ICD-10-CM

## 2024-08-09 PROCEDURE — 97802 MEDICAL NUTRITION INDIV IN: CPT

## 2024-08-09 NOTE — PROGRESS NOTES
"NUTRITION ASSESSMENT NOTE    Reason for Nutrition Visit:  Pt is a 68 y.o. female being seen in person for an initial appointment at [facility] referred for morbid obesity.        Anthropometrics:  Wt: 191#, 86.6kg  Ht: 5'0\"  BMI: 37.30 kg/m2      Past Medical Hx:  Patient Active Problem List   Diagnosis    Abdominal cramping    Abnormal stress test    Anxiety disorder    Asthma (HHS-HCC)    Hypoxia    Chronic epigastric pain    Postprandial RUQ pain    Chronic LLQ pain    Fatigue    Female pelvic pain    Fibroids    Gallbladder polyp    GERD (gastroesophageal reflux disease)    Hyperlipemia    Hypersomnia with sleep apnea    Intramural leiomyoma of uterus    Lung nodule    Obesity (BMI 30-39.9)    GARY (obstructive sleep apnea)    Restless legs    Shortness of breath on exertion    Tachycardia    Thickened endometrium    Urge and stress incontinence    Ventricular ectopy    Allergic rhinitis    Anemia    Chest pain    Diarrhea    Gastric ulcer    History of hypertension    Iron deficiency anemia    Morbid obesity (Multi)    Nausea and vomiting    Spasm of back muscles    Mental disorder, not otherwise specified    Leiomyoma    Personal history of peptic ulcer disease          No results found for: \"HGBA1C\", \"CHOL\", \"LDLF\", \"TRIG\"       Food and Nutrition Hx:  Pt states they seek  from dietitian for help with weight loss.     Physical Activity: Silver sneakers gym w/ dance routines and low weight training x 45 minutes x twice/week.     Stress: High stress, a great deal of anxiety that the pt admits is \"bad.\"     Sleep: Good, averaging 7 hours/night, some waking through the night.     Primary GOAL of pt: Weight loss w/ 50-60# loss.     RDN provided full array of medical nutrition therapy (MNT) recommendations with specific  governance over weight management.     DIETARY RECALL: *Pt consumes an average of 1-3 meals/day*  Wake-up: ~5am  Meal 1: Some days, ~8am, Banana or grapes  Meal 2: Some days (3 days/week), " "~12pm, [employer] baked potatoes, macaroni and cheese, sandwich w/ ervin/lettuce/tomato/deli meat  Meal 3: Everyday, ~6:30pm/7pm, Pasta, hamburgers, hot dogs, meatloaf (ground beef @ 80/20), chicken, salads (taco, chicken, fruit), vegetables (canned)  Snacks: Evenings, Fruit (grapes), yogurt (regular), chocolate  Bedtime: ~10:30pm  Beverages: Diet Coke x 12oz daily, sugar-free iced tea x ~30oz daily, water x 30-40oz      NUTRITIONAL ARTIFACTS:  Owns and uses an air fryer    Allergies: None  Intolerance: None -- Pt later admits \"fried foods\"  Appetite: Good  Intake: >75%  GI Symptoms : None Frequency: absent  Swallowing Difficulty: No problems with swallowing  Dentition : own -- Ok condition, , no issues chewing    Supplements: Iron daily    Energy Levels: Low    Food Preparation: Patient  Cooking Skills/Barriers: None reported       Nutrition Focused Physical Exam:    Performed/Deferred: Deferred as pt visually appears well-nourished with no signs of malnutrition    Estimated Energy Needs:    *Pt BMI @ 37.30 kg/m2*  WEIGHT LOSS: 25-30 kcal/kg IBW = 8231-0410 kcal/day  PROTEIN Needs: 0.8-1 g/kg = 70-85 g/day    Nutrition Diagnosis:    Diagnosis Statement 1:  Diagnosis Status: New  Diagnosis : Inadequate protein intake  related to food and nutrition related knowledge deficit concerning appropriate amount and type of dietary fat and/or protein as evidenced by  dietary recall reflecting daily intake of protein at levels <50% daily recommended amounts    Diagnosis Statement 2:  Diagnosis Status: New  Diagnosis : Inadequate fiber intake  related to food and nutrition related knowledge deficit concerning desirable quantities of fiber as evidenced by estimated intake of fiber that is insufficient when compared to recommended amounts (38 g/day for men and 25 g/day for women)    Diagnosis Statement 3:   Diagnosis Status: New  Diagnosis : Food and nutrition related knowledge deficit related to lack of or limited prior " nutrition-related education as evidenced by verbalizes inaccurate or incomplete knowledge    Nutrition Interventions:  Anti-Inflammatory Diet, Consistent Carbohydrate Diet, DASH Diet, Decreased Carbohydrate Diet, Increased Fiber Diet, Increased Soluble Fiber, Increased Protein Diet, Low Saturated Fat Diet, Mediterranean Diet, and Plant Based Diet  Nutrition Counseling: Motivational Interviewing, Goal Setting, and Health Belief Model  Coordination of Care: None      Educational Handouts: ADA My Plate, Protein Content of Foods, 30DMDMP, ACLM NB's, ACLM FAMJS, F/V/WG A-Z, Healthy Snack Blueprint

## 2024-08-13 ENCOUNTER — TELEPHONE (OUTPATIENT)
Dept: UROLOGY | Facility: CLINIC | Age: 69
End: 2024-08-13
Payer: MEDICARE

## 2024-08-13 NOTE — TELEPHONE ENCOUNTER
I'm covering his messages. She needs to have an appointment for samples. Please schedule her an appointment with me.

## 2024-08-13 NOTE — TELEPHONE ENCOUNTER
Sara, I do not think that you have seen her, I think Migdalia did, just send back to me and I will send to him  Thank you

## 2024-08-16 ENCOUNTER — HOSPITAL ENCOUNTER (OUTPATIENT)
Dept: RADIOLOGY | Facility: HOSPITAL | Age: 69
Discharge: HOME | End: 2024-08-16
Payer: MEDICARE

## 2024-08-16 DIAGNOSIS — R91.1 SOLITARY PULMONARY NODULE: ICD-10-CM

## 2024-08-16 PROCEDURE — 71250 CT THORAX DX C-: CPT

## 2024-08-20 ENCOUNTER — APPOINTMENT (OUTPATIENT)
Dept: UROLOGY | Facility: CLINIC | Age: 69
End: 2024-08-20
Payer: MEDICARE

## 2024-08-20 VITALS — HEIGHT: 60 IN | WEIGHT: 197.2 LBS | BODY MASS INDEX: 38.72 KG/M2

## 2024-08-20 DIAGNOSIS — N32.81 OAB (OVERACTIVE BLADDER): Primary | ICD-10-CM

## 2024-08-20 DIAGNOSIS — N39.41 URGE INCONTINENCE OF URINE: ICD-10-CM

## 2024-08-20 LAB
POC APPEARANCE, URINE: CLEAR
POC BILIRUBIN, URINE: NEGATIVE
POC BLOOD, URINE: ABNORMAL
POC COLOR, URINE: YELLOW
POC GLUCOSE, URINE: NEGATIVE MG/DL
POC KETONES, URINE: NEGATIVE MG/DL
POC LEUKOCYTES, URINE: ABNORMAL
POC NITRITE,URINE: NEGATIVE
POC PH, URINE: 6 PH
POC PROTEIN, URINE: ABNORMAL MG/DL
POC SPECIFIC GRAVITY, URINE: 1.02
POC UROBILINOGEN, URINE: 0.2 EU/DL

## 2024-08-20 RX ORDER — SODIUM BICARBONATE 84 MG/ML
10 INJECTION, SOLUTION INTRAVENOUS ONCE
Status: SHIPPED | OUTPATIENT
Start: 2024-08-20

## 2024-08-20 RX ORDER — SODIUM CHLORIDE 0.9 G/100ML
10 IRRIGANT IRRIGATION ONCE
Status: SHIPPED | OUTPATIENT
Start: 2024-08-20

## 2024-08-20 RX ORDER — NITROFURANTOIN 25; 75 MG/1; MG/1
CAPSULE ORAL
Qty: 6 CAPSULE | Refills: 0 | Status: SHIPPED | OUTPATIENT
Start: 2024-08-20

## 2024-08-20 RX ORDER — LIDOCAINE HYDROCHLORIDE 10 MG/ML
50 INJECTION INFILTRATION; PERINEURAL ONCE
Status: SHIPPED | OUTPATIENT
Start: 2024-08-20

## 2024-08-20 NOTE — PROGRESS NOTES
Dennise Rizo presents to clinic today for follow up on medications   Medications reviewed with patient:  Allergies verified   Tobacco use verified.  Pharmacy verified  Surgical and medical history reviewed    Health Maintenance Due   Topic Date Due   • Shingles Vaccine (2 of 3) 07/28/2008   • Medicare Wellness Visit  08/15/2020       Patient is up to date, no discussion needed.         HISTORY OF PRESENT ILLNESS:  Luciana Figueroa is a 68 y.o. female who presents today for a botox injection.          Past Medical History  She has a past medical history of Anxiety disorder, unspecified, Muscle spasm of back, Personal history of other diseases of the circulatory system, and Personal history of peptic ulcer disease (12/21/2020).    Surgical History  She has a past surgical history that includes Other surgical history (07/21/2020).     Social History  She reports that she has never smoked. She has never used smokeless tobacco. She reports current alcohol use. She reports that she does not use drugs.    Family History  Family History   Problem Relation Name Age of Onset    Heart failure Mother      Coronary artery disease Father      Lung cancer Brother      Breast cancer Father's Sister          Allergies  Patient has no known allergies.      A comprehensive 10+ review of systems was negative except for: see hpi                          PHYSICAL EXAMINATION:  BP Readings from Last 3 Encounters:   07/30/24 136/82   05/07/24 134/81   04/02/24 128/68      Wt Readings from Last 3 Encounters:   08/20/24 89.4 kg (197 lb 3.2 oz)   07/30/24 86.6 kg (191 lb)   04/02/24 86.6 kg (191 lb)      BMI:   Estimated body mass index is 38.51 kg/m² as calculated from the following:    Height as of this encounter: 1.524 m (5').    Weight as of this encounter: 89.4 kg (197 lb 3.2 oz).  BSA:   Estimated body surface area is 1.95 meters squared as calculated from the following:    Height as of this encounter: 1.524 m (5').    Weight as of this encounter: 89.4 kg (197 lb 3.2 oz).  HEENT: Normocephalic, atraumatic, PER EOMI, nonicteric, trachea normal, thyroid normal, oropharynx normal.  CARDIAC: regular rate & rhythm, S1 & S2 normal.  No heaves, thrills, gallops or murmurs.  LUNGS: Clear to auscultation, no spinal or CV tenderness.  EXTREMITIES: No evidence of cyanosis, clubbing or edema.       Botox Injection    Luciana came  today for Botox injection.  I reviewed with her the risks and benefits including ptosis, infection, and bleeding. She has no contraindications. She is on no antibiotics and has no neuromuscular disorders.  Pregnancy is not an issue.     She tolerated the procedure well.  The patient  will return to see me again in three to four months, earlier if there are any problems.     Luciana understands the side effects and risks, as well as the necessity for continued treatment to maintain improvement.  Additional therapy may be necessary. Call if there are any problems. See diagram for injection sites and dosages. 100 units were used at a concentration of 10 units per 0.1 mL.         Assessment:  Luciana Figueroa is a 68 y.o. with KALEB, OAB and GSM.      KALEB: Urge dominant  -discussed mechanism of UUI and RAMESH, and treatment options for both including PFT, pessary, sling for RAMESH and PFT, pharmacotherapy and third-line therapy for OAB     UDS demonstrates stress incontinence and normal emptying     Doing well on Gemtesa      she is equally bothered by both conditions    Wants to try Botox first, 100 units every 6 months     GSM     Stop vaginal estrogen it caused irritation with itching    Lotrisone    S/P botox, 100 units, 8/20/24  Rx abx      Follow up in 4 to 6 weeks     All questions and concerns were answered and addressed.  The patient expressed understanding and agrees with the plan.     Gee Negron MD    Scribe Attestation  By signing my name below, I, Lara Santiago, Scribe   attest that this documentation has been prepared under the direction and in the presence of Gee Negron MD.

## 2024-08-23 ENCOUNTER — TELEPHONE (OUTPATIENT)
Dept: PULMONOLOGY | Facility: HOSPITAL | Age: 69
End: 2024-08-23
Payer: MEDICARE

## 2024-08-23 NOTE — TELEPHONE ENCOUNTER
Called and spoke with patient regarding the results of her CT. Instructed patient to call us back with any further questions or concerns. Patient was agreeable to treatment plan and acknowledged understanding.     ----- Message from Brady Jackson sent at 8/22/2024  3:19 PM EDT -----  Lung nodule is stable on the CT chest. No further intervention necessary at this time. Will discuss further on follow up appt in early Oct 2024.

## 2024-09-24 ENCOUNTER — APPOINTMENT (OUTPATIENT)
Dept: UROLOGY | Facility: CLINIC | Age: 69
End: 2024-09-24
Payer: MEDICARE

## 2024-09-24 DIAGNOSIS — N32.81 OAB (OVERACTIVE BLADDER): ICD-10-CM

## 2024-09-24 DIAGNOSIS — N39.3 SUI (STRESS URINARY INCONTINENCE, FEMALE): Primary | ICD-10-CM

## 2024-09-24 PROCEDURE — 87086 URINE CULTURE/COLONY COUNT: CPT

## 2024-09-24 PROCEDURE — 99214 OFFICE O/P EST MOD 30 MIN: CPT | Performed by: STUDENT IN AN ORGANIZED HEALTH CARE EDUCATION/TRAINING PROGRAM

## 2024-09-24 PROCEDURE — 87186 SC STD MICRODIL/AGAR DIL: CPT

## 2024-09-24 RX ORDER — MIRABEGRON 50 MG/1
50 TABLET, FILM COATED, EXTENDED RELEASE ORAL DAILY
Qty: 90 TABLET | Refills: 3 | Status: SHIPPED
Start: 2024-09-24 | End: 2024-09-24 | Stop reason: ALTCHOICE

## 2024-09-24 RX ORDER — FESOTERODINE FUMARATE 4 MG/1
4 TABLET, FILM COATED, EXTENDED RELEASE ORAL DAILY
Qty: 30 TABLET | Refills: 11 | Status: SHIPPED | OUTPATIENT
Start: 2024-09-24 | End: 2025-09-24

## 2024-09-24 NOTE — PROGRESS NOTES
HISTORY OF PRESENT ILLNESS:  Luciana Figueroa is a 69 y.o. female who presents today for a follow up visit.  She reports she had no improvement with the botox. She states that she goes through over 5 pads a day with her incontinence.          Past Medical History  She has a past medical history of Anxiety disorder, unspecified, Muscle spasm of back, Personal history of other diseases of the circulatory system, and Personal history of peptic ulcer disease (12/21/2020).    Surgical History  She has a past surgical history that includes Other surgical history (07/21/2020).     Social History  She reports that she has never smoked. She has never used smokeless tobacco. She reports current alcohol use. She reports that she does not use drugs.    Family History  Family History   Problem Relation Name Age of Onset    Heart failure Mother      Coronary artery disease Father      Lung cancer Brother      Breast cancer Father's Sister          Allergies  Patient has no known allergies.      A comprehensive 10+ review of systems was negative except for: see hpi                          PHYSICAL EXAMINATION:  BP Readings from Last 3 Encounters:   07/30/24 136/82   05/07/24 134/81   04/02/24 128/68      Wt Readings from Last 3 Encounters:   08/20/24 89.4 kg (197 lb 3.2 oz)   07/30/24 86.6 kg (191 lb)   04/02/24 86.6 kg (191 lb)      BMI: Estimated body mass index is 38.51 kg/m² as calculated from the following:    Height as of 8/20/24: 1.524 m (5').    Weight as of 8/20/24: 89.4 kg (197 lb 3.2 oz).  BSA: Estimated body surface area is 1.95 meters squared as calculated from the following:    Height as of 8/20/24: 1.524 m (5').    Weight as of 8/20/24: 89.4 kg (197 lb 3.2 oz).  HEENT: Normocephalic, atraumatic, PER EOMI, nonicteric, trachea normal, thyroid normal, oropharynx normal.  CARDIAC: regular rate & rhythm, S1 & S2 normal.  No heaves, thrills, gallops or murmurs.  LUNGS: Clear to auscultation, no spinal or CV  tenderness.  EXTREMITIES: No evidence of cyanosis, clubbing or edema.               Assessment:  Luciana Figueroa is a 69 y.o. with KALEB, OAB and GSM.      KALEB: Urge dominant  -discussed mechanism of UUI and RAMESH, and treatment options for both including PFT, pessary, sling for RAMESH and PFT, pharmacotherapy and third-line therapy for OAB     UDS demonstrates stress incontinence and normal emptying         she is equally bothered by both conditions     S/P botox, 100 units, 8/20/24, having urgency, refill gemtesa, trial of toviaz in the meantime     Urine culture ordered     Can consider sling       Follow up in 6 weeks          GSM:   Stop vaginal estrogen it caused irritation with itching     Lotrisone         All questions and concerns were answered and addressed.  The patient expressed understanding and agrees with the plan.     Gee Negron MD    Scribe Attestation  By signing my name below, I Laraben Santiago, Scribe   attest that this documentation has been prepared under the direction and in the presence of Gee Negron MD.

## 2024-09-27 LAB — BACTERIA UR CULT: ABNORMAL

## 2024-10-01 ENCOUNTER — TELEPHONE (OUTPATIENT)
Dept: UROLOGY | Facility: CLINIC | Age: 69
End: 2024-10-01
Payer: MEDICARE

## 2024-10-01 DIAGNOSIS — N39.0 RECURRENT UTI: Primary | ICD-10-CM

## 2024-10-01 RX ORDER — SULFAMETHOXAZOLE AND TRIMETHOPRIM 800; 160 MG/1; MG/1
1 TABLET ORAL 2 TIMES DAILY
Qty: 10 TABLET | Refills: 0 | Status: SHIPPED | OUTPATIENT
Start: 2024-10-01 | End: 2024-10-06

## 2024-10-01 NOTE — TELEPHONE ENCOUNTER
Patient calling in stated she was in last Tuesday and she stated she did a urine culture and has a UTI.  She said you would prescribe her something but she said she never received it. Can you send it to her pharmacy.

## 2024-10-09 ENCOUNTER — APPOINTMENT (OUTPATIENT)
Dept: PULMONOLOGY | Facility: HOSPITAL | Age: 69
End: 2024-10-09
Payer: MEDICARE

## 2024-10-11 ENCOUNTER — TELEPHONE (OUTPATIENT)
Dept: GASTROENTEROLOGY | Facility: CLINIC | Age: 69
End: 2024-10-11
Payer: MEDICARE

## 2024-10-11 NOTE — TELEPHONE ENCOUNTER
Requesting refill for pantoprazole 40 mg [facility]. Please advise.   Lov 10/31/23  Follow up appt 11/8/24

## 2024-10-12 DIAGNOSIS — K21.9 GASTROESOPHAGEAL REFLUX DISEASE WITHOUT ESOPHAGITIS: ICD-10-CM

## 2024-10-15 DIAGNOSIS — K21.9 GASTROESOPHAGEAL REFLUX DISEASE WITHOUT ESOPHAGITIS: ICD-10-CM

## 2024-10-15 RX ORDER — PANTOPRAZOLE SODIUM 40 MG/1
40 TABLET, DELAYED RELEASE ORAL DAILY
Qty: 30 TABLET | Refills: 0 | Status: SHIPPED | OUTPATIENT
Start: 2024-10-15

## 2024-10-15 RX ORDER — PANTOPRAZOLE SODIUM 40 MG/1
40 TABLET, DELAYED RELEASE ORAL DAILY
Qty: 90 TABLET | Refills: 3 | OUTPATIENT
Start: 2024-10-15

## 2024-10-22 ENCOUNTER — APPOINTMENT (OUTPATIENT)
Dept: PULMONOLOGY | Facility: HOSPITAL | Age: 69
End: 2024-10-22
Payer: MEDICARE

## 2024-11-01 ENCOUNTER — TELEPHONE (OUTPATIENT)
Dept: GASTROENTEROLOGY | Facility: CLINIC | Age: 69
End: 2024-11-01
Payer: MEDICARE

## 2024-11-01 DIAGNOSIS — R10.9 ABDOMINAL CRAMPING: ICD-10-CM

## 2024-11-01 DIAGNOSIS — K21.9 GASTROESOPHAGEAL REFLUX DISEASE WITHOUT ESOPHAGITIS: ICD-10-CM

## 2024-11-01 RX ORDER — FAMOTIDINE 20 MG/1
20 TABLET, FILM COATED ORAL
Qty: 30 TABLET | Refills: 0 | Status: SHIPPED | OUTPATIENT
Start: 2024-11-01 | End: 2024-12-01

## 2024-11-01 RX ORDER — DICYCLOMINE HYDROCHLORIDE 10 MG/1
10 CAPSULE ORAL 3 TIMES DAILY PRN
Qty: 60 CAPSULE | Refills: 0 | Status: SHIPPED | OUTPATIENT
Start: 2024-11-01

## 2024-11-01 RX ORDER — PANTOPRAZOLE SODIUM 40 MG/1
40 TABLET, DELAYED RELEASE ORAL DAILY
Qty: 30 TABLET | Refills: 0 | Status: SHIPPED | OUTPATIENT
Start: 2024-11-01

## 2024-11-05 ENCOUNTER — TELEMEDICINE (OUTPATIENT)
Dept: PHARMACY | Facility: HOSPITAL | Age: 69
End: 2024-11-05
Payer: MEDICARE

## 2024-11-05 ENCOUNTER — APPOINTMENT (OUTPATIENT)
Dept: UROLOGY | Facility: CLINIC | Age: 69
End: 2024-11-05
Payer: MEDICARE

## 2024-11-05 DIAGNOSIS — N39.3 SUI (STRESS URINARY INCONTINENCE, FEMALE): ICD-10-CM

## 2024-11-05 DIAGNOSIS — N32.81 OAB (OVERACTIVE BLADDER): ICD-10-CM

## 2024-11-05 DIAGNOSIS — N32.81 OAB (OVERACTIVE BLADDER): Primary | ICD-10-CM

## 2024-11-05 PROCEDURE — 99442 PR PHYS/QHP TELEPHONE EVALUATION 11-20 MIN: CPT | Performed by: STUDENT IN AN ORGANIZED HEALTH CARE EDUCATION/TRAINING PROGRAM

## 2024-11-05 RX ORDER — FESOTERODINE FUMARATE 4 MG/1
4 TABLET, FILM COATED, EXTENDED RELEASE ORAL DAILY
Qty: 30 TABLET | Refills: 11 | Status: SHIPPED | OUTPATIENT
Start: 2024-11-05 | End: 2025-11-05

## 2024-11-05 NOTE — PROGRESS NOTES
HISTORY OF PRESENT ILLNESS:  Luciana Figueroa is a 69 y.o. female who presents today for a follow up visit.  She is currently doing well. She has no complaints or concerns at this time. Her symptoms are doing well.           Past Medical History  She has a past medical history of Anxiety disorder, unspecified, Muscle spasm of back, Personal history of other diseases of the circulatory system, and Personal history of peptic ulcer disease (12/21/2020).    Surgical History  She has a past surgical history that includes Other surgical history (07/21/2020).     Social History  She reports that she has never smoked. She has never used smokeless tobacco. She reports current alcohol use. She reports that she does not use drugs.    Family History  Family History   Problem Relation Name Age of Onset    Heart failure Mother      Coronary artery disease Father      Lung cancer Brother      Breast cancer Father's Sister          Allergies  Patient has no known allergies.      A comprehensive 10+ review of systems was negative except for: see hpi              Assessment:  Luciana Figueroa is a 69 y.o. with KALEB, OAB and GSM.      KALEB: Urge dominant  -discussed mechanism of UUI and RAMESH, and treatment options for both including PFT, pessary, sling for RAMESH and PFT, pharmacotherapy and third-line therapy for OAB  -UDS demonstrates stress incontinence and normal emptying  -she is equally bothered by both conditions  -S/P botox, 100 units, 8/20/24, having urgency, not on gemetesa, tried it in the past,  -Rx toviaz    Can consider SNM vs PTNS (Vivally)       GSM:   -Stop vaginal estrogen it caused irritation with itching  -Lotrisone      Follow up in 3 months     All questions and concerns were answered and addressed.  The patient expressed understanding and agrees with the plan.     Gee Negron MD    Scribe Attestation  By signing my name below, I, Paulina Alvarado   attest that this documentation has been prepared under the  direction and in the presence of Gee Negron MD.

## 2024-11-05 NOTE — PROGRESS NOTES
"  Patient ID: Luciana Figueroa is a 69 y.o. female who presents for Overactive bladder.    Referring Provider: Gee Negron MD   Pt was referred for overactive bladder    [facility]erred [facility]:    [email or web address] [place] [facility] [address] [facility]  [address] [facility] [address]  Phone: [phone] Fax: [phone]    [address] - [address] [facility]  [address] [facility] [address]  Phone: [phone] Fax: [phone]      Subjective      Vaginal Symptoms  Vaginal Dryness:   Dysuria (pain, burning, stinging, or itching with urination):   Vaginal and/or vulvar irritation/itching:   Recurrent urinary tract infections:   No results found for: \"ESTRADIOLFRE\", \"PROGESTERONE\", \"ESTRADIOL\", \"FSH\"    Urinary Symptoms  Medications that may contribute to symptoms:   none  Any history of:   Narrow-angle glaucoma? no  Impaired gastric emptying? no  Urinary retention? no    If diabetes, blood sugar controlled?   Frequently of bowel movement?   Tobacco use?   How many protective undergarments are you utilizing daily?   Recommend Coloplast Sammy Protect moisture barrier cream for incontinence associated skin irritation/breakdown.     What medications have been tried/stopped?   Gemtesa - cost  Current medication? Toviaz 4 mg daily  When started? Last month  Side effects? No side effect  Improvement in symptoms? Significantly, patient reported better improvement compared to gemtesa      Cardiovascular Health  The ASCVD Risk score (Marissa DK, et al., 2019) failed to calculate for the following reasons:    Cannot find a previous HDL lab    Cannot find a previous total cholesterol lab    No results found for: \"CHOL\"  No results found for: \"HDL\"  No results found for: \"LDLCALC\"  No results found for: \"TRIG\"  No components found for: \"CHOLHDL\"        Blood Sugar Balance  Lab Results   Component Value Date    GLUCOSE 88 05/03/2022     No results found for: \"LEPTIN\", \"INSULFAST\", \"GLUF\"      Thyroid  No " "results found for: \"TSH\", \"FREET4\", \"M8QUOYY\", \"T3FREE\", \"N3WYRWA\", \"T3\", \"THYROIDPAB\"    Iron Status  Lab Results   Component Value Date    IRON 61 01/05/2021    TIBC 383 01/05/2021    FERRITIN 19 01/05/2021        Kidney Function  Lab Results   Component Value Date    GFRF >90 05/03/2022    CREATININE 0.72 05/03/2022       Potassium  Lab Results   Component Value Date    K 3.4 (L) 05/03/2022        Vitamin D3  No results found for: \"VITD25\"    Current Outpatient Medications on File Prior to Visit   Medication Sig Dispense Refill    acetaminophen (TylenoL) 325 mg tablet Take by mouth.      albuterol (ProAir HFA) 90 mcg/actuation inhaler Inhale 2 puffs every 4 hours if needed for wheezing. 13.4 g 11    busPIRone (Buspar) 15 mg tablet Take 1 tablet (15 mg) by mouth every 12 hours. 60 tablet 5    carvedilol (Coreg) 25 mg tablet Take 1 tablet (25 mg) by mouth 2 times daily (morning and late afternoon).      cyclobenzaprine (Flexeril) 10 mg tablet Take 1 tablet (10 mg) by mouth.      dicyclomine (Bentyl) 10 mg capsule Take 1 capsule (10 mg) by mouth 3 times a day as needed (abdominal pain). 60 capsule 0    estradiol (Estrace) 0.01 % (0.1 mg/gram) vaginal cream Insert pea size amount into vagina every night for 2 weeks, then 2x/week after (Patient not taking: Reported on 7/30/2024) 42.5 g 12    famotidine (Pepcid) 20 mg tablet Take 1 tablet (20 mg) by mouth once daily. 30 tablet 0    ferrous gluconate (Fergon) 324 (38 Fe) mg tablet Take 1 tablet (38 mg of iron) by mouth once daily at bedtime.      ferrous sulfate 325 (65 Fe) MG EC tablet Take by mouth once daily.      fesoterodine (Toviaz) 4 mg tablet extended release 24 hr Take 1 tablet (4 mg) by mouth once daily. 30 tablet 11    fluticasone propion-salmeteroL (Wixela Inhub) 500-50 mcg/dose diskus inhaler Inhale 1 puff 2 times a day. Rinse mouth with water after use to reduce aftertaste and incidence of candidiasis. Do not swallow. 60 each 11    hydroCHLOROthiazide " (HYDRODiuril) 25 mg tablet Take 1 tablet (25 mg) by mouth once daily.      nitrofurantoin, macrocrystal-monohydrate, (Macrobid) 100 mg capsule Take 1 pill x2 per day for 3 days 6 capsule 0    omeprazole (PriLOSEC) 40 mg DR capsule Take 1 capsule (40 mg) by mouth.      pantoprazole (ProtoNix) 40 mg EC tablet Take 1 tablet (40 mg) by mouth once daily. 30 tablet 0    rosuvastatin (Crestor) 20 mg tablet once every 24 hours.      sertraline (Zoloft) 100 mg tablet Take 1.5 tablets (150 mg) by mouth once daily.      tiZANidine (Zanaflex) 4 mg tablet Take 1 tablet (4 mg) by mouth 3 times a day as needed.      [DISCONTINUED] dicyclomine (Bentyl) 10 mg capsule Take 1 capsule (10 mg) by mouth 3 times a day as needed (abdominal pain). 60 capsule 0    [DISCONTINUED] famotidine (Pepcid) 20 mg tablet Take 1 tablet (20 mg) by mouth once daily. 90 tablet 3    [DISCONTINUED] pantoprazole (ProtoNix) 40 mg EC tablet Take 1 tablet (40 mg) by mouth once daily. 30 tablet 0     Current Facility-Administered Medications on File Prior to Visit   Medication Dose Route Frequency Provider Last Rate Last Admin    lidocaine (Xylocaine) 10 mg/mL (1 %) injection 50 mL  50 mL intravesical Once Sara Berkowitz, PA-C        onabotulinumtoxinA (Botox) injection 100 Units  100 Units intravesical Once Sara Berkowitz, PA-C        sodium bicarbonate 10 mEq/10 mL (8.4 %) injection 10 mEq  10 mEq intravesical Once Sara Berkowitz, PA-C        sodium chloride 0.9 % irrigation solution 10 mL  10 mL irrigation Once Sara Berkowitz, PA-C            Medication and allergy reconciliation completed     Drug Interactions   No significant drug interactions identified    Assessment/Plan     Patient is experiencing urinary frequency, urgency, and incontinence. Patient has been using Toviaz due to cost issue with Gemtesa, patient reported better improvement with Toviaz and would like to continue  Has tried before Gemtesa, cost issue, did not work as well    PATIENT  EDUCATION/DISCUSSION:  - Counseled patient on MOA, expectations, side effects, duration of therapy, contraindications, administration, and monitoring parameters  - Answered all patient questions and concerns    LABS  Lab Results   Component Value Date    GFRF >90 05/03/2022     Make sure GFR >30 ml/min or dose adjust      CONTINUE  Toviaz 4 mg daily    Follow-up: as needed     Time spent with pt: Total length of time 30 (minutes) of the encounter and more than 50% was spent counseling the patient.      Nadja StringerD  [facility] Clinical Pharmacist  Phone: [phone]     Continue all meds under the continuation of care with the referring provider and clinical pharmacy team.    Verbal consent to manage patient's drug therapy was obtained from the patient and/or an individual authorized to act on behalf of a patient. They were informed they may decline to participate or withdraw from participation in pharmacy services at any time.

## 2024-11-08 ENCOUNTER — APPOINTMENT (OUTPATIENT)
Dept: GASTROENTEROLOGY | Facility: CLINIC | Age: 69
End: 2024-11-08
Payer: MEDICARE

## 2024-11-08 VITALS
SYSTOLIC BLOOD PRESSURE: 124 MMHG | BODY MASS INDEX: 38.28 KG/M2 | HEIGHT: 60 IN | HEART RATE: 82 BPM | WEIGHT: 195 LBS | DIASTOLIC BLOOD PRESSURE: 77 MMHG

## 2024-11-08 DIAGNOSIS — R10.9 ABDOMINAL CRAMPING: ICD-10-CM

## 2024-11-08 DIAGNOSIS — M19.049 OSTEOARTHRITIS OF HAND, UNSPECIFIED LATERALITY, UNSPECIFIED OSTEOARTHRITIS TYPE: Primary | ICD-10-CM

## 2024-11-08 DIAGNOSIS — K21.9 GASTROESOPHAGEAL REFLUX DISEASE WITHOUT ESOPHAGITIS: ICD-10-CM

## 2024-11-08 PROCEDURE — 99214 OFFICE O/P EST MOD 30 MIN: CPT | Performed by: NURSE PRACTITIONER

## 2024-11-08 PROCEDURE — 3008F BODY MASS INDEX DOCD: CPT | Performed by: NURSE PRACTITIONER

## 2024-11-08 PROCEDURE — 1159F MED LIST DOCD IN RCRD: CPT | Performed by: NURSE PRACTITIONER

## 2024-11-08 RX ORDER — DICYCLOMINE HYDROCHLORIDE 10 MG/1
10 CAPSULE ORAL 3 TIMES DAILY PRN
Qty: 100 CAPSULE | Refills: 3 | Status: SHIPPED | OUTPATIENT
Start: 2024-11-08 | End: 2025-11-08

## 2024-11-08 RX ORDER — PANTOPRAZOLE SODIUM 40 MG/1
40 TABLET, DELAYED RELEASE ORAL DAILY
Qty: 90 TABLET | Refills: 3 | Status: SHIPPED | OUTPATIENT
Start: 2024-11-08 | End: 2025-11-08

## 2024-11-08 RX ORDER — FAMOTIDINE 20 MG/1
20 TABLET, FILM COATED ORAL
Qty: 90 TABLET | Refills: 3 | Status: SHIPPED | OUTPATIENT
Start: 2024-11-08 | End: 2025-11-08

## 2024-11-08 ASSESSMENT — ENCOUNTER SYMPTOMS
TROUBLE SWALLOWING: 0
DIZZINESS: 0
SHORTNESS OF BREATH: 0
CHILLS: 0
PALPITATIONS: 0
ADENOPATHY: 0
DIFFICULTY URINATING: 0
JOINT SWELLING: 0
FEVER: 0
WEAKNESS: 0
CONFUSION: 0
ROS GI COMMENTS: SEE HPI
BRUISES/BLEEDS EASILY: 0
ARTHRALGIAS: 1
COUGH: 0
SORE THROAT: 0
WOUND: 0

## 2024-11-08 NOTE — PATIENT INSTRUCTIONS
Thank you for coming to your appointment today   - I have refilled the dicyclomine, pantoprazole, and famotidine for 1 year   - I put in a referral to ortho. They should call you, but you can also call them at [phone]   - Follow up with me in 1 year or sooner if needed    Please call [phone] with any questions or concerns       If you utilize [employer] messages, please understand that these are intended to be used for simple and straightforward medical questions. If you have a more complex question or numerous complaints, an office appointment may be needed.

## 2024-11-08 NOTE — PROGRESS NOTES
Subjective   Patient ID: uLciana Figueroa is a 69 y.o. female with PMH of covid-19, lung nodule, gastric ulcer, anxiety, depression, and HTN who presents for Med Refill (Last OV 10/31/23 - colon 1/30/18  EGD 11/19/20).     Patient's PCP is Elsie Quiles MD     Med Refill  Associated symptoms include arthralgias. Pertinent negatives include no chest pain, chills, coughing, fever, joint swelling, rash, sore throat or weakness.     Patient has followed with GI since 2020 for history of gastric ulcer, GERD, and abdominal pain. CT A/P in 2020 showed uterine fibroids which she has seen gynecology for and biopsy was unremarkable; per her gynecologist no further action was needed. She also saw general surgery for a gallbladder polyp and repeat imaging was completed; it had not grown and size and did not require further follow up per surgery. She was last seen by GI in 10/2023 for follow up. Reflux was well controlled with pantoprazole 40mg daily and famotidine 20mg daily at that time. She was using bentyl 10mg once daily for abdominal pain/cramping and was using IBgard.     She continues to do well from a GI standpoint. Reflux remains well controlled with pantoprazole 40mg daily and famotidine 20mg daily. She is using the bentyl 10mg once daily; no longer using IBgard. No dizziness, blurry vision, dry mouth, trouble urinating, or constipation issues with the dicyclomine. She has no breakthrough reflux/heartburn.     She denies unintended weight loss, dysphagia, N/V, hematemesis, melena, diarrhea, constipation, or hematochezia.     She complains of arthritic pain in the hands today and would like to see someone who does injections.       Summary of endoscopies:  - Colonoscopy 1/2018: normal   - EGD 8/2020: multiple gastric erosions and superficial ulcers, benign fundic gland polyps, normal esophagus and duodenum   - EGD 11/2020: normal esophagus, mild gastritis, gastric polyps, mild duodenum. (polyps are fundic gland  polyps on pathology)     Social Hx:  Tobacco: none   Etoh: none  Recreational drug use: none  NSAIDs: none      Family Hx:    No family hx GI malignancy, pancreatitis,or IBD     Review of Systems:  Review of Systems   Constitutional:  Negative for chills and fever.   HENT:  Negative for sore throat and trouble swallowing.    Respiratory:  Negative for cough and shortness of breath.    Cardiovascular:  Negative for chest pain and palpitations.   Gastrointestinal:         SEE HPI   Endocrine: Negative for cold intolerance and heat intolerance.   Genitourinary:  Negative for difficulty urinating.   Musculoskeletal:  Positive for arthralgias. Negative for joint swelling.   Skin:  Negative for rash and wound.   Neurological:  Negative for dizziness and weakness.   Hematological:  Negative for adenopathy. Does not bruise/bleed easily.   Psychiatric/Behavioral:  Negative for confusion.         Medications:  Prior to Admission medications    Medication Sig Start Date End Date Taking? Authorizing Provider   acetaminophen (TylenoL) 325 mg tablet Take 1 tablet (325 mg) by mouth every 4 hours if needed. 8/21/20   Historical Provider, MD   albuterol (ProAir HFA) 90 mcg/actuation inhaler Inhale 2 puffs every 4 hours if needed for wheezing. 7/30/24 7/30/25  Brady Jackson MD   busPIRone (Buspar) 15 mg tablet Take 1 tablet (15 mg) by mouth every 12 hours. 5/17/24   Radha Soliman APRN-CNP   carvedilol (Coreg) 25 mg tablet Take 1 tablet (25 mg) by mouth 2 times daily (morning and late afternoon). 10/17/19   Historical Provider, MD   dicyclomine (Bentyl) 10 mg capsule Take 1 capsule (10 mg) by mouth 3 times a day as needed (abdominal pain). 11/8/24 11/8/25  Juli Wiseman APRN-CNP   estradiol (Estrace) 0.01 % (0.1 mg/gram) vaginal cream Insert pea size amount into vagina every night for 2 weeks, then 2x/week after  Patient not taking: Reported on 11/5/2024 5/14/24   Sara Berkowitz PA-C   famotidine (Pepcid) 20 mg tablet Take  1 tablet (20 mg) by mouth once daily. 11/8/24 11/8/25  REGINALD Simms   ferrous gluconate (Fergon) 324 (38 Fe) mg tablet Take 1 tablet (38 mg of iron) by mouth once daily at bedtime.    Historical Provider, MD   fesoterodine (Toviaz) 4 mg tablet extended release 24 hr Take 1 tablet (4 mg) by mouth once daily. 9/24/24 9/24/25  Gee Negron MD   fesoterodine (Toviaz) 4 mg tablet extended release 24 hr Take 1 tablet (4 mg) by mouth once daily. 11/5/24 11/5/25  Gee Negron MD   fluticasone propion-salmeteroL (Wixela Inhub) 500-50 mcg/dose diskus inhaler Inhale 1 puff 2 times a day. Rinse mouth with water after use to reduce aftertaste and incidence of candidiasis. Do not swallow. 7/30/24   Brady Jackson MD   nitrofurantoin, macrocrystal-monohydrate, (Macrobid) 100 mg capsule Take 1 pill x2 per day for 3 days  Patient not taking: Reported on 11/8/2024 8/20/24   Sara Berkowitz PA-C   pantoprazole (ProtoNix) 40 mg EC tablet Take 1 tablet (40 mg) by mouth once daily. 11/8/24 11/8/25  REGINALD Simms   rosuvastatin (Crestor) 20 mg tablet Take 1 tablet (20 mg) by mouth once daily. 6/6/23   Historical Provider, MD   sertraline (Zoloft) 100 mg tablet Take 1.5 tablets (150 mg) by mouth once daily. 10/17/19   Historical Provider, MD   tiZANidine (Zanaflex) 4 mg tablet Take 1 tablet (4 mg) by mouth 3 times a day as needed.    Historical Provider, MD   cyclobenzaprine (Flexeril) 10 mg tablet Take 1 tablet (10 mg) by mouth once daily at bedtime. 10/17/19 11/5/24  Historical Provider, MD   dicyclomine (Bentyl) 10 mg capsule Take 1 capsule (10 mg) by mouth 3 times a day as needed (abdominal pain). 11/1/24 11/8/24  REGINALD Simms   famotidine (Pepcid) 20 mg tablet Take 1 tablet (20 mg) by mouth once daily. 11/1/24 11/8/24  REGINALD Simms   ferrous sulfate 325 (65 Fe) MG EC tablet Take by mouth once daily. 4/30/21 11/5/24  Historical Provider, MD   hydroCHLOROthiazide  (HYDRODiuril) 25 mg tablet Take 1 tablet (25 mg) by mouth once daily. 10/17/19 11/5/24  Historical Provider, MD   omeprazole (PriLOSEC) 40 mg DR capsule Take 1 capsule (40 mg) by mouth. 7/21/20 11/5/24  Historical Provider, MD   pantoprazole (ProtoNix) 40 mg EC tablet Take 1 tablet (40 mg) by mouth once daily. 11/1/24 11/8/24  Juli Wiseman, APRN-CNP       Allergies:  Patient has no known allergies.    Past Medical History:  She has a past medical history of Anxiety disorder, unspecified, Muscle spasm of back, Personal history of other diseases of the circulatory system, and Personal history of peptic ulcer disease (12/21/2020).    Past Surgical History:  She has a past surgical history that includes Other surgical history (07/21/2020).    Social History:  She reports that she has never smoked. She has never used smokeless tobacco. She reports current alcohol use. She reports that she does not use drugs.    Objective   Physical exam:  Physical Exam  Constitutional:       General: She is not in acute distress.     Appearance: Normal appearance.   HENT:      Mouth/Throat:      Mouth: Mucous membranes are moist.      Comments: pink  Eyes:      Conjunctiva/sclera: Conjunctivae normal.      Pupils: Pupils are equal, round, and reactive to light.   Cardiovascular:      Rate and Rhythm: Normal rate and regular rhythm.      Heart sounds: No murmur heard.  Pulmonary:      Effort: Pulmonary effort is normal.      Breath sounds: Normal breath sounds.   Abdominal:      General: Bowel sounds are normal. There is no distension.      Palpations: Abdomen is soft.      Tenderness: There is no abdominal tenderness. There is no guarding.   Skin:     General: Skin is warm and dry.      Coloration: Skin is not jaundiced.   Neurological:      Mental Status: She is alert and oriented to person, place, and time.   Psychiatric:         Mood and Affect: Mood normal.         Behavior: Behavior normal.          Assessment/Plan      GERD  Continue pantoprazole 40mg daily and famotidine 20mg daily. Symptoms currently well controlled without breakthrough symptoms.     Screening colon cancer   Normal colonoscopy in 2018. Would be due to 2028 if benefits outweigh risks at that time.     Arthritic pain of the hands   Pt requests referral ortho for possible injections; referral placed.     Abdominal pain   Using dicyclomine 10mg once daily. No current side effects. Medication refilled. Educated on increased risk of dizziness/falls with this medication over age 65       Follow up in 1 year or sooner if needed          Juli Wiseman, APRN-CNP

## 2024-11-15 ENCOUNTER — OFFICE VISIT (OUTPATIENT)
Dept: PULMONOLOGY | Facility: HOSPITAL | Age: 69
End: 2024-11-15
Payer: MEDICARE

## 2024-11-15 VITALS
SYSTOLIC BLOOD PRESSURE: 134 MMHG | DIASTOLIC BLOOD PRESSURE: 82 MMHG | BODY MASS INDEX: 40.96 KG/M2 | WEIGHT: 195.1 LBS | RESPIRATION RATE: 16 BRPM | HEIGHT: 58 IN | TEMPERATURE: 98.2 F | HEART RATE: 69 BPM | OXYGEN SATURATION: 93 %

## 2024-11-15 DIAGNOSIS — G47.33 OSA (OBSTRUCTIVE SLEEP APNEA): ICD-10-CM

## 2024-11-15 DIAGNOSIS — R06.02 SHORTNESS OF BREATH: ICD-10-CM

## 2024-11-15 DIAGNOSIS — R91.1 SOLITARY PULMONARY NODULE: ICD-10-CM

## 2024-11-15 DIAGNOSIS — J45.40 MODERATE PERSISTENT ASTHMA WITHOUT COMPLICATION (HHS-HCC): Primary | ICD-10-CM

## 2024-11-15 DIAGNOSIS — J30.89 PERENNIAL ALLERGIC RHINITIS WITH SEASONAL VARIATION: ICD-10-CM

## 2024-11-15 DIAGNOSIS — E66.01 MORBID OBESITY (MULTI): ICD-10-CM

## 2024-11-15 DIAGNOSIS — K21.9 GASTROESOPHAGEAL REFLUX DISEASE WITHOUT ESOPHAGITIS: ICD-10-CM

## 2024-11-15 DIAGNOSIS — F41.9 CHRONIC ANXIETY: ICD-10-CM

## 2024-11-15 DIAGNOSIS — J30.2 PERENNIAL ALLERGIC RHINITIS WITH SEASONAL VARIATION: ICD-10-CM

## 2024-11-15 PROCEDURE — 3008F BODY MASS INDEX DOCD: CPT | Performed by: INTERNAL MEDICINE

## 2024-11-15 PROCEDURE — 1159F MED LIST DOCD IN RCRD: CPT | Performed by: INTERNAL MEDICINE

## 2024-11-15 PROCEDURE — 99214 OFFICE O/P EST MOD 30 MIN: CPT | Performed by: INTERNAL MEDICINE

## 2024-11-15 RX ORDER — ALBUTEROL SULFATE 90 UG/1
2 INHALANT RESPIRATORY (INHALATION) EVERY 4 HOURS PRN
Qty: 18 G | Refills: 11 | Status: SHIPPED | OUTPATIENT
Start: 2024-11-15 | End: 2025-11-15

## 2024-11-15 RX ORDER — MOMETASONE FUROATE AND FORMOTEROL FUMARATE DIHYDRATE 200; 5 UG/1; UG/1
2 AEROSOL RESPIRATORY (INHALATION)
Qty: 1 G | Refills: 7 | Status: SHIPPED | OUTPATIENT
Start: 2024-11-15

## 2024-11-15 ASSESSMENT — PATIENT HEALTH QUESTIONNAIRE - PHQ9
SUM OF ALL RESPONSES TO PHQ9 QUESTIONS 1 AND 2: 0
1. LITTLE INTEREST OR PLEASURE IN DOING THINGS: NOT AT ALL
2. FEELING DOWN, DEPRESSED OR HOPELESS: NOT AT ALL

## 2024-11-15 ASSESSMENT — ENCOUNTER SYMPTOMS
OCCASIONAL FEELINGS OF UNSTEADINESS: 0
FATIGUE: 0
CHILLS: 0
UNEXPECTED WEIGHT CHANGE: 0
FEVER: 0
RHINORRHEA: 0
WHEEZING: 0
LOSS OF SENSATION IN FEET: 0
SHORTNESS OF BREATH: 1
COUGH: 0

## 2024-11-15 ASSESSMENT — COLUMBIA-SUICIDE SEVERITY RATING SCALE - C-SSRS
1. IN THE PAST MONTH, HAVE YOU WISHED YOU WERE DEAD OR WISHED YOU COULD GO TO SLEEP AND NOT WAKE UP?: NO
2. HAVE YOU ACTUALLY HAD ANY THOUGHTS OF KILLING YOURSELF?: NO
6. HAVE YOU EVER DONE ANYTHING, STARTED TO DO ANYTHING, OR PREPARED TO DO ANYTHING TO END YOUR LIFE?: NO

## 2024-11-15 NOTE — PROGRESS NOTES
"Subjective   Patient ID: Luciana Figueroa is a 69 y.o. female who presents for asthma follow up.     Asthma  She complains of shortness of breath. There is no cough (denies hemoptysis.) or wheezing. Pertinent negatives include no chest pain, fever, postnasal drip or rhinorrhea. Her past medical history is significant for asthma.   : Patient has PMH of asthma, covid, lung nodule, allergic rhinitis, anxiety, depression, and HTN. She states that her breathing is stable and she only uses Xopenex with weather changes mostly. She states Montelukast helps improve s/s. She has GARY but noncompliant with CPAP.  She states that her O2 tests are normal but at home her POX ranges from 88 to 92%. She notes SOB with activity and has remained at rest and activity same. She states when she eats she has to stop and breathe. She states she is not taking any inhalers because insurance quit paying for it since March 2024.     She is here for follow up today. She denies any SOB, coughing and wheezing. She does not like wixela DPI although feels she is not any worse since the last visit and reports \"Feeling better.\" She reports she still has anxiety and not worse or better.     Review of Systems   Constitutional:  Negative for chills, fatigue, fever and unexpected weight change.   HENT:  Negative for congestion, postnasal drip and rhinorrhea.    Respiratory:  Positive for shortness of breath. Negative for cough (denies hemoptysis.) and wheezing.    Cardiovascular:  Negative for chest pain and leg swelling.   All other systems reviewed and are negative.      Objective   Physical Exam  Vitals reviewed.   Constitutional:       Appearance: Normal appearance.   HENT:      Head: Normocephalic.   Cardiovascular:      Rate and Rhythm: Normal rate and regular rhythm.   Pulmonary:      Effort: Pulmonary effort is normal.      Breath sounds: Normal breath sounds.   Skin:     General: Skin is warm and dry.   Neurological:      Mental Status: She is " alert.       Assessment/Plan   1. Exercise Induced Bronchial Asthma (EIA  2. Allergic rhinitis  3. Obstructive sleep apnea, noncompliant   4. Right lower lobe lung nodule stable since Dec 2019  5. Anxiety disorder  6. Obesity, BMI 37.3 (191)  7. Stage I diastolic dysfunction  8. Hypoxia, Obesity hypoventilation   9. GERD/Hiatal hernia and PUD     Plan:  -PFTs discussed 5/3/24: FEV1 98%, no BDR, normal volumes and DLCO normal.   -She was unable to afford Breo and has tried multiple other maintenance inhalers which did not help. Will continue on Xopenex prn. Tried wixela which has helped somewhat but does not like DPI but she only uses once a day only.   -Normal ECHO and Stress Test March 2024.   -DC Montelukast HS as symptoms are better; denies postnasal drip and cough.  -CT chest for follow up on lung nodule is stable since Nov/2019/April 2020.   -I recommend a psychiatry consult for her. Continue buspirone for anxiety at 15 mg BID.   -Weight loss as discussed. Recommend weight management consult.  -Cont pantoprazole in AM and Famotidine HS.   -HST discussed showing GARY that is severe she has been unable to tolerate CPAP due to anxiety. She states she sent CPAP back to [facility] due to intolerance. I discussed risks of untreated GARY at length. We discussed risk of cerebrovascular, cardiovascular and metabolic morbidity and mortality associated with untreated GARY. SHe states there is no way she will use CPAP. She has been evaluated by ENT for inspire and not deemed a candidate due to BMI >35, oral appliance not indicated and not interested in that. She will work on weight loss and sleep on her side.  -6MWT with exertional hypoxia of 91% but did not require O2 supplementation. Suspect this is secondary to OHS. Repeat 6MWT without need for O2 supplementation with activity with lowest SpO2 92%.  -Iron panel with Iron 61 and ferritin 19. Recommend iron supplementation also from RLS standpoint.   -Discussed weight loss program  such as silver sneaker, increase physical activity regimen  -CT chest for follow up on RLL lung nodule 1.1 cm. Has a family hx of lung cancer.      Her dyspnea appears to be related to her chronic anxiety, physical deconditioning from inactivity and weight gain with BMI now 37.3. Her PFTs are normal and cardiac work up is normal. We have attempted optimization of Rx from Exercise Induced Asthma standpoint but has limitation to take inhalers due to cost. However, she has now noted improvement with Wixela but does not like DPI. We will attempt switching to Dulera MDI as that is the only MDI covered.  Advised to continue with Rx with for EIA, weight loss and increase physical activity through silver sneaker program that she is attending. We discussed dietary management for weight loss and she has been to the weight management center. She is also noticing control of anxiety today with Buspirone which may also be the reason for improved breathing.     Recommend albuterol inhaler before exercising, continue Wixela or Dulera whichever is covered and follow up in 6-8 months. She refuses vaccinations.

## 2024-11-15 NOTE — PATIENT INSTRUCTIONS
1. Take Albuterol 2 puffs as needed for shortness of breath, wheezing or chest tightness and coughing spells. Please take 2 puffs about 10 minutes before exercise.   2. Continue buspirone 15 mg 1 tablet twice daily for anxiety.   3. Weight management as discussed. Establish a physical activity regimen. Follow up with weight management center.  4. Call with any questions or concerns. Please notify if any worsening of respiratory issues.   5. Please establish with Dietician for nutrition consult and also follow up at weight management center.   6. Start Dulera 2 puffs twice daily and if any issues with this inhaler, go back to Wixela and notify us.  7. Follow up with me in 6 to 8 months. If any worsening of symptoms, please contact our office to make a sooner appointment.

## 2024-11-19 ENCOUNTER — APPOINTMENT (OUTPATIENT)
Dept: ORTHOPEDIC SURGERY | Facility: HOSPITAL | Age: 69
End: 2024-11-19
Payer: MEDICARE

## 2024-11-26 ENCOUNTER — HOSPITAL ENCOUNTER (OUTPATIENT)
Dept: RADIOLOGY | Facility: CLINIC | Age: 69
Discharge: HOME | End: 2024-11-26
Payer: MEDICARE

## 2024-11-26 ENCOUNTER — APPOINTMENT (OUTPATIENT)
Dept: ORTHOPEDIC SURGERY | Facility: CLINIC | Age: 69
End: 2024-11-26
Payer: MEDICARE

## 2024-11-26 VITALS — BODY MASS INDEX: 40.96 KG/M2 | HEIGHT: 58 IN | WEIGHT: 195.11 LBS

## 2024-11-26 DIAGNOSIS — M79.642 BILATERAL HAND PAIN: ICD-10-CM

## 2024-11-26 DIAGNOSIS — G56.03 BILATERAL CARPAL TUNNEL SYNDROME: Primary | ICD-10-CM

## 2024-11-26 DIAGNOSIS — M79.641 BILATERAL HAND PAIN: ICD-10-CM

## 2024-11-26 PROCEDURE — 99204 OFFICE O/P NEW MOD 45 MIN: CPT | Performed by: EMERGENCY MEDICINE

## 2024-11-26 PROCEDURE — 1036F TOBACCO NON-USER: CPT | Performed by: EMERGENCY MEDICINE

## 2024-11-26 PROCEDURE — 1125F AMNT PAIN NOTED PAIN PRSNT: CPT | Performed by: EMERGENCY MEDICINE

## 2024-11-26 PROCEDURE — 1159F MED LIST DOCD IN RCRD: CPT | Performed by: EMERGENCY MEDICINE

## 2024-11-26 PROCEDURE — 73130 X-RAY EXAM OF HAND: CPT | Mod: BILATERAL PROCEDURE | Performed by: RADIOLOGY

## 2024-11-26 PROCEDURE — 3008F BODY MASS INDEX DOCD: CPT | Performed by: EMERGENCY MEDICINE

## 2024-11-26 PROCEDURE — 73130 X-RAY EXAM OF HAND: CPT | Mod: 50

## 2024-11-26 ASSESSMENT — PAIN - FUNCTIONAL ASSESSMENT: PAIN_FUNCTIONAL_ASSESSMENT: 0-10

## 2024-11-26 ASSESSMENT — PAIN SCALES - GENERAL: PAINLEVEL_OUTOF10: 6

## 2024-11-26 NOTE — PROGRESS NOTES
Subjective    Patient ID: Luciana Figueroa is a 69 y.o. female.    Chief Complaint: Pain of the Left Hand (B/L hand pain for the past 2 months. Left hand pain goes to the shoulder. Denies neck pain. No Hx of trauma, Sx, or injections. denies numbness/tingling.  XR done today. Tylenol with mild improvement, PRN) and Pain of the Right Hand     Last Surgery: No surgery found  Last Surgery Date: No surgery found    Luciana is a very pleasant 69-year-old female who is right-hand dominant and who is coming in with bilateral hand and wrist pain.  Her symptoms began without any known traumatic events or injuries about 2 months ago and she states that the left is slightly worse than the right.  The left radiates up towards her elbow.  She has decreased  strength in both of her wrists.  She takes Tylenol occasionally for the pain which does seem to help.  She is interested in potentially doing cortisone injections.  She has never had any prior surgeries in her hands/wrists and denies similar prior episodes.  No infectious symptoms.        Objective   Right Hand Exam     Tenderness   The patient is experiencing tenderness in the palmar area.    Range of Motion   The patient has normal right wrist ROM.     Muscle Strength   Wrist extension: 5/5   Wrist flexion: 5/5   : 4/5     Tests   Phalen’s sign: positive  Tinel's sign (median nerve): positive    Other   Erythema: absent  Sensation: normal  Pulse: present      Left Hand Exam     Tenderness   The patient is experiencing tenderness in the palmar area.     Range of Motion   The patient has normal left wrist ROM.    Muscle Strength   Wrist extension: 5/5   Wrist flexion: 5/5   :  4/5     Tests   Phalen’s sign: positive  Tinel's sign (median nerve): positive    Other   Erythema: absent  Sensation: normal  Pulse: present    Comments:  Bilateral median nerve provocative testing is positive.            Image Results:  X-rays of both hands were reviewed and interpreted by  me on 11/26/2024 and overall did not really reveal any acute cause of her pain.  No evidence of injuries or fractures.  Overall decent joint spacing with only mild degenerative changes.    Assessment/Plan   Encounter Diagnoses:  Bilateral carpal tunnel syndrome    Bilateral hand pain    Orders Placed This Encounter    XR hand 3+ views bilateral    EMG & nerve conduction     No follow-ups on file.    We discussed her treatment options and ultimately decided to place her in a left wrist cock up splint, obtain bilateral EMGs to screen for carpal tunnel syndrome, and she is also going to begin using topical Voltaren at prescription dosing 3 times daily.  She will then follow-up with me after the EMG or we will refer her to Dr. Mcgill.    **Patient was prescribed a wrist cock-up splint for [possible carpal tunnel syndrome]. The patient has weakness, instability and/or deformity of their [L wrist] which requires stabilization from this orthosis to improve their function.       Verbal and written instructions for the use, wear schedule, cleaning and application of this item were given.  Patient was instructed that should the brace result in increased pain, decreased sensation, increased swelling, or an overall worsening of their medical condition, to please contact our office immediately.      Orthotic management and training was provided for skin care, modifications due to healing tissues, edema changes, interruption in skin integrity, and safety precautions with the orthosis.**    ** Please excuse any errors in grammar or translation related to this dictation. Voice recognition software was utilized to prepare this document. **       Eric Heck MD  [facility]

## 2025-01-15 DIAGNOSIS — F41.9 ANXIETY: ICD-10-CM

## 2025-01-15 RX ORDER — BUSPIRONE HYDROCHLORIDE 15 MG/1
15 TABLET ORAL EVERY 12 HOURS
Qty: 60 TABLET | Refills: 10 | Status: SHIPPED | OUTPATIENT
Start: 2025-01-15

## 2025-01-24 ENCOUNTER — HOSPITAL ENCOUNTER (OUTPATIENT)
Dept: RADIOLOGY | Facility: CLINIC | Age: 70
Discharge: HOME | End: 2025-01-24
Payer: MEDICARE

## 2025-01-24 DIAGNOSIS — Z78.0 ASYMPTOMATIC MENOPAUSAL STATE: ICD-10-CM

## 2025-01-24 PROCEDURE — 77080 DXA BONE DENSITY AXIAL: CPT

## 2025-02-10 NOTE — PROGRESS NOTES
Virtual or Telephone Consent    A telephone visit (audio only) between the patient (at the originating site) and the provider (at the distant site) was utilized to provide this telehealth service.   Verbal consent was requested and obtained from Luciana Figueroa on this date, 02/11/25 for a telehealth visit.     HISTORY OF PRESENT ILLNESS:  Luciana Figueroa is a 69 y.o. female who presents today for a virtual follow up visit. She reports that she is doing well with the botox and her medications. She has no complaints at this time.           Past Medical History  She has a past medical history of Anxiety disorder, unspecified, Muscle spasm of back, Personal history of other diseases of the circulatory system, and Personal history of peptic ulcer disease (12/21/2020).    Surgical History  She has a past surgical history that includes Other surgical history (07/21/2020).     Social History  She reports that she has never smoked. She has never used smokeless tobacco. She reports current alcohol use. She reports that she does not use drugs.    Family History  Family History   Problem Relation Name Age of Onset    Heart failure Mother      Coronary artery disease Father      Lung cancer Brother      Breast cancer Father's Sister          Allergies  Patient has no known allergies.      A comprehensive 10+ review of systems was negative except for: see hpi                  Assessment:  Luciana Figueroa is a 69 y.o. with KALEB, OAB and GSM.      KALEB:   -UDS demonstrates stress incontinence and normal emptying  -S/P botox, 100 units, 8/20/24, having urgency, not on gemetesa, tried it in the past,  -doing well with this and toviaz, continue         GSM:   -Stopped vaginal estrogen it caused irritation with itching  -continue Lotrisone      Follow up in 3 months        I spent a total of 11 minutes speaking with the patient on the telephone     All questions and concerns were answered and addressed.  The patient expressed  understanding and agrees with the plan.     Gee Negron MD    Scribe Attestation  By signing my name below, I, Lara Santiago, Scribtran   attest that this documentation has been prepared under the direction and in the presence of Gee Negron MD.

## 2025-02-11 ENCOUNTER — APPOINTMENT (OUTPATIENT)
Facility: HOSPITAL | Age: 70
End: 2025-02-11
Payer: MEDICARE

## 2025-02-11 DIAGNOSIS — N32.81 OAB (OVERACTIVE BLADDER): Primary | ICD-10-CM

## 2025-04-04 ENCOUNTER — APPOINTMENT (OUTPATIENT)
Dept: CARDIOLOGY | Facility: CLINIC | Age: 70
End: 2025-04-04
Payer: MEDICARE

## 2025-04-04 VITALS
HEIGHT: 61 IN | DIASTOLIC BLOOD PRESSURE: 88 MMHG | HEART RATE: 67 BPM | WEIGHT: 193 LBS | SYSTOLIC BLOOD PRESSURE: 132 MMHG | BODY MASS INDEX: 36.44 KG/M2

## 2025-04-04 DIAGNOSIS — R06.02 SHORTNESS OF BREATH ON EXERTION: ICD-10-CM

## 2025-04-04 LAB
ATRIAL RATE: 67 BPM
P AXIS: 4 DEGREES
P OFFSET: 209 MS
P ONSET: 152 MS
PR INTERVAL: 156 MS
Q ONSET: 230 MS
QRS COUNT: 11 BEATS
QRS DURATION: 74 MS
QT INTERVAL: 396 MS
QTC CALCULATION(BAZETT): 418 MS
QTC FREDERICIA: 411 MS
R AXIS: 9 DEGREES
T AXIS: 30 DEGREES
T OFFSET: 428 MS
VENTRICULAR RATE: 67 BPM

## 2025-04-04 PROCEDURE — 99213 OFFICE O/P EST LOW 20 MIN: CPT | Mod: 25 | Performed by: INTERNAL MEDICINE

## 2025-04-04 PROCEDURE — 93005 ELECTROCARDIOGRAM TRACING: CPT | Performed by: INTERNAL MEDICINE

## 2025-04-04 PROCEDURE — 99213 OFFICE O/P EST LOW 20 MIN: CPT | Performed by: INTERNAL MEDICINE

## 2025-04-04 PROCEDURE — 1159F MED LIST DOCD IN RCRD: CPT | Performed by: INTERNAL MEDICINE

## 2025-04-04 PROCEDURE — 1036F TOBACCO NON-USER: CPT | Performed by: INTERNAL MEDICINE

## 2025-04-04 PROCEDURE — 3008F BODY MASS INDEX DOCD: CPT | Performed by: INTERNAL MEDICINE

## 2025-04-04 PROCEDURE — 1160F RVW MEDS BY RX/DR IN RCRD: CPT | Performed by: INTERNAL MEDICINE

## 2025-04-04 PROCEDURE — 93010 ELECTROCARDIOGRAM REPORT: CPT | Performed by: INTERNAL MEDICINE

## 2025-04-04 ASSESSMENT — ENCOUNTER SYMPTOMS
LOSS OF SENSATION IN FEET: 0
NERVOUS/ANXIOUS: 1
OCCASIONAL FEELINGS OF UNSTEADINESS: 1
DEPRESSION: 0

## 2025-04-04 NOTE — LETTER
"April 4, 2025     Elsie Quiles MD  [address]  [facility] [address]    Patient: Luciana Figueroa   YOB: 1955   Date of Visit: 4/4/2025       Dear Dr. Elsie Quiles MD:    Thank you for referring Luciana Figueroa to me for evaluation. Below are my notes for this consultation.  If you have questions, please do not hesitate to call me. I look forward to following your patient along with you.       Sincerely,     Osvaldo Barnes MD      CC: No Recipients  ______________________________________________________________________________________    Counseling:  The patient was counseled regarding diagnostic results, instructions for management, risk factor reductions, prognosis, patient and family education, impressions, risks and benefits of treatment options and importance of compliance with treatment.      Chief Complaint:   The patient presents today for annual followup of exertional SOB.    History Of Present Illness:    Luciana Figueroa is a 69 year old female patient who presents today for annual followup of exertional SOB. Her PMH is significant for asthma, hypersomnia with GARY, anxiety, GERD, and hyperlipidemia.     She has been doing okay since her last visit, she has been experiencing some increased anxiety due to work but overall from a cardiac standpoint she is doing fine. She continues to follow with pulmonology as well and is stable. She had updated blood work within the last year.     Last Recorded Vitals:  Vitals:    04/04/25 1016   BP: 132/88   BP Location: Left arm   Pulse: 67   Weight: 87.5 kg (193 lb)   Height: 1.549 m (5' 1\")       Past Surgical History:  She has a past surgical history that includes Other surgical history (07/21/2020).      Social History:  She reports that she has never smoked. She has never used smokeless tobacco. She reports current alcohol use. She reports that she does not use drugs.    Family History:  Family History   Problem Relation Name Age of Onset   • " Heart failure Mother     • Coronary artery disease Father     • Lung cancer Brother     • Breast cancer Father's Sister          Allergies:  Patient has no known allergies.    Outpatient Medications:  Current Outpatient Medications   Medication Instructions   • acetaminophen (TYLENOL) 325 mg, Every 4 hours PRN   • albuterol (ProAir HFA) 90 mcg/actuation inhaler 2 puffs, inhalation, Every 4 hours PRN   • busPIRone (BUSPAR) 15 mg, oral, Every 12 hours   • carvedilol (Coreg) 25 mg tablet 1 tablet, 2 times daily (morning and late afternoon)   • dicyclomine (BENTYL) 10 mg, oral, 3 times daily PRN   • estradiol (Estrace) 0.01 % (0.1 mg/gram) vaginal cream Insert pea size amount into vagina every night for 2 weeks, then 2x/week after   • famotidine (PEPCID) 20 mg, oral, Daily RT   • ferrous gluconate (Fergon) 324 (38 Fe) mg tablet 38 mg of iron, Nightly   • fesoterodine (TOVIAZ) 4 mg, oral, Daily   • fesoterodine (TOVIAZ) 4 mg, oral, Daily   • fluticasone propion-salmeteroL (Wixela Inhub) 500-50 mcg/dose diskus inhaler 1 puff, inhalation, 2 times daily RT, Rinse mouth with water after use to reduce aftertaste and incidence of candidiasis. Do not swallow.   • mometasone-formoterol (Dulera) 200-5 mcg/actuation inhaler 2 puffs, inhalation, 2 times daily RT, Rinse mouth with water after use.   • pantoprazole (PROTONIX) 40 mg, oral, Daily   • rosuvastatin (CRESTOR) 20 mg, Daily   • sertraline (Zoloft) 100 mg tablet 1.5 tablets, Daily   • tiZANidine (Zanaflex) 4 mg tablet 1 tablet, 3 times daily PRN     Review of Systems   Psychiatric/Behavioral:  The patient is nervous/anxious (anxiety - work related).    All other systems reviewed and are negative.     Physical Exam:  Constitutional:       Appearance: Healthy appearance. Not in distress.   Neck:      Vascular: No JVR. JVD normal.   Pulmonary:      Effort: Pulmonary effort is normal.      Breath sounds: Normal breath sounds. No wheezing. No rhonchi. No rales.   Chest:       Chest wall: Not tender to palpatation.   Cardiovascular:      PMI at left midclavicular line. Normal rate. Regular rhythm. Normal S1. Normal S2.       Murmurs: There is no murmur.      No gallop.  No click. No rub.   Pulses:     Intact distal pulses.   Edema:     Peripheral edema absent.   Abdominal:      General: Bowel sounds are normal.      Palpations: Abdomen is soft.      Tenderness: There is no abdominal tenderness.   Musculoskeletal: Normal range of motion.         General: No tenderness. Skin:     General: Skin is warm and dry.   Neurological:      General: No focal deficit present.      Mental Status: Alert and oriented to person, place and time.          Last Labs:  CBC -  Lab Results   Component Value Date    WBC 7.9 05/03/2022    HGB 14.4 05/03/2022    HCT 43.8 05/03/2022    MCV 90 05/03/2022     05/03/2022       CMP -  Lab Results   Component Value Date    CALCIUM 9.6 05/03/2022    PROT 7.3 05/03/2022    ALBUMIN 4.2 05/03/2022    AST 15 05/03/2022    ALT 15 05/03/2022    ALKPHOS 69 05/03/2022    BILITOT 0.4 05/03/2022       RENAL FUNCTION PANEL -   Lab Results   Component Value Date    GLUCOSE 88 05/03/2022     05/03/2022    K 3.4 (L) 05/03/2022     05/03/2022    CO2 28 05/03/2022    ANIONGAP 11 05/03/2022    BUN 21 05/03/2022    CREATININE 0.72 05/03/2022    CALCIUM 9.6 05/03/2022    ALBUMIN 4.2 05/03/2022        Lab Results   Component Value Date    BNP 28 10/26/2020       Last Cardiology Tests:  05/04/2024 - PFTs  These pulmonary function studies are within normal limits.     03/22/2024 - TTE  1. Left ventricular systolic function is normal with a 55-60% estimated ejection fraction.  2. Spectral Doppler shows an abnormal pattern of left ventricular diastolic filling.  3. There is low normal right ventricular systolic function.  4. Aortic valve stenosis is not present.  5. Lipomatous hypertrophy of the atrial septum.    03/22/2024 - Stress Test  1. SPECT stress myocardial perfusion  imaging in response to pharmacologic stress demonstrate no evidence of reversible ischemia or infarction. Well-maintained left ventricular function with an LV ejection fraction of 80%.  2. No ECG changes from baseline. No clinical or electrocardiographic evidence for ischemia at maximal infusion.    10/21/2020 - CT Chest w/Contrast  1. Bilateral patchy opacities consistent with active pneumonitis/pneumonia. This can be seen with COVID-19 infection.  2. Stable 1 cm smooth nonspecific noncalcified right lower lobe pulmonary nodule compared to 04/06/2020 with no other older studies for comparison.     04/06/2020 - TTE  1. The left ventricular systolic function is normal with a 60% estimated ejection fraction.  2. Spectral Doppler shows an impaired relaxation pattern of left ventricular diastolic filling.  3. RVSP within normal limits.  4. Aortic valve stenosis is not present.     04/06/2020 - CT Angio Chest for PE  1. No evidence of pulmonary embolism.  2. Right lower lobe 1.1 cm solitary pulmonary nodule. It is stable when compared to short-term prior imaging, CT abdomen and pelvis from November 2019; however, there is no long-term imaging available for comparison. Possible considerations include PET-CT and/or follow-up chest CT in 3-6 months.  3. No evidence of any enlarged mediastinal or hilar lymph nodes.     03/09/2020 - CT Chest w/o Contrast   Clear lungs. No acute process.     01/27/2020 - Cardiac Catheterization (LH)  No evidence of significant coronary artery disease.     01/08/2020 - Exercise Stress Test  1. Abnormal stress test due to chest pain and ventricular ectopy.  2. The inadequate level of stress was achieved. The patient achieved 1:36 minutes with a MPHR 89%  3. Very poor exercise capacity.  4. Consider additional cardiac evaluation.     Lab review: I have personally reviewed the laboratory result(s).     Assessment/Plan  1) Exertional SOB   On carvedilol 25 mg BID, HCTZ 25 mg daily, rosuvastatin 20  mg daily  [facility] negative in Jan. 2020  No PE by CT chest April 2020  Repeat CT chest 04/2021 with bronchitis and stable right lower lobe pulmonary nodule - follows with pulmonology  TTE 03/22/2024 with LVEF 55-60%, lipomatous hypertrophy of the atrial septum  Stress 03/22/2024 negative for ischemia  PFTs 05/04/2024 WNL  Follow up in 1 year    2) Lung Nodule  Followed by Pulmonary   CT chest 04/2021 with stable RLL pulmonary nodule   CT chest 03/2023 with stable findings  Persistent exertional SOB with negative cardiac workup  Per patient, inhaler is no longer covered by insurance   Advised to followup with pulmonology to discuss alternative inhaler   PFTs 05/04/2024 WNL     3) GARY  Management per pulmonary  Does not tolerate BiPAP/CPAP      Scribe Attestation  By signing my name below, IJanay Scribe   attest that this documentation has been prepared under the direction and in the presence of Osvaldo Barnes MD.     Scribe Attestation  By signing my name below, I, Paulina Gonzalez   attest that this documentation has been prepared under the direction and in the presence of Osvaldo Barnes MD.

## 2025-04-04 NOTE — PROGRESS NOTES
"Counseling:  The patient was counseled regarding diagnostic results, instructions for management, risk factor reductions, prognosis, patient and family education, impressions, risks and benefits of treatment options and importance of compliance with treatment.      Chief Complaint:   The patient presents today for annual followup of exertional SOB.    History Of Present Illness:    Luciana Figueroa is a 69 year old female patient who presents today for annual followup of exertional SOB. Her PMH is significant for asthma, hypersomnia with GARY, anxiety, GERD, and hyperlipidemia.     She has been doing okay since her last visit, she has been experiencing some increased anxiety due to work but overall from a cardiac standpoint she is doing fine. She continues to follow with pulmonology as well and is stable. She had updated blood work within the last year.     Last Recorded Vitals:  Vitals:    04/04/25 1016   BP: 132/88   BP Location: Left arm   Pulse: 67   Weight: 87.5 kg (193 lb)   Height: 1.549 m (5' 1\")       Past Surgical History:  She has a past surgical history that includes Other surgical history (07/21/2020).      Social History:  She reports that she has never smoked. She has never used smokeless tobacco. She reports current alcohol use. She reports that she does not use drugs.    Family History:  Family History   Problem Relation Name Age of Onset    Heart failure Mother      Coronary artery disease Father      Lung cancer Brother      Breast cancer Father's Sister          Allergies:  Patient has no known allergies.    Outpatient Medications:  Current Outpatient Medications   Medication Instructions    acetaminophen (TYLENOL) 325 mg, Every 4 hours PRN    albuterol (ProAir HFA) 90 mcg/actuation inhaler 2 puffs, inhalation, Every 4 hours PRN    busPIRone (BUSPAR) 15 mg, oral, Every 12 hours    carvedilol (Coreg) 25 mg tablet 1 tablet, 2 times daily (morning and late afternoon)    dicyclomine (BENTYL) 10 mg, oral, " 3 times daily PRN    estradiol (Estrace) 0.01 % (0.1 mg/gram) vaginal cream Insert pea size amount into vagina every night for 2 weeks, then 2x/week after    famotidine (PEPCID) 20 mg, oral, Daily RT    ferrous gluconate (Fergon) 324 (38 Fe) mg tablet 38 mg of iron, Nightly    fesoterodine (TOVIAZ) 4 mg, oral, Daily    fesoterodine (TOVIAZ) 4 mg, oral, Daily    fluticasone propion-salmeteroL (Wixela Inhub) 500-50 mcg/dose diskus inhaler 1 puff, inhalation, 2 times daily RT, Rinse mouth with water after use to reduce aftertaste and incidence of candidiasis. Do not swallow.    mometasone-formoterol (Dulera) 200-5 mcg/actuation inhaler 2 puffs, inhalation, 2 times daily RT, Rinse mouth with water after use.    pantoprazole (PROTONIX) 40 mg, oral, Daily    rosuvastatin (CRESTOR) 20 mg, Daily    sertraline (Zoloft) 100 mg tablet 1.5 tablets, Daily    tiZANidine (Zanaflex) 4 mg tablet 1 tablet, 3 times daily PRN     Review of Systems   Psychiatric/Behavioral:  The patient is nervous/anxious (anxiety - work related).    All other systems reviewed and are negative.     Physical Exam:  Constitutional:       Appearance: Healthy appearance. Not in distress.   Neck:      Vascular: No JVR. JVD normal.   Pulmonary:      Effort: Pulmonary effort is normal.      Breath sounds: Normal breath sounds. No wheezing. No rhonchi. No rales.   Chest:      Chest wall: Not tender to palpatation.   Cardiovascular:      PMI at left midclavicular line. Normal rate. Regular rhythm. Normal S1. Normal S2.       Murmurs: There is no murmur.      No gallop.  No click. No rub.   Pulses:     Intact distal pulses.   Edema:     Peripheral edema absent.   Abdominal:      General: Bowel sounds are normal.      Palpations: Abdomen is soft.      Tenderness: There is no abdominal tenderness.   Musculoskeletal: Normal range of motion.         General: No tenderness. Skin:     General: Skin is warm and dry.   Neurological:      General: No focal deficit  present.      Mental Status: Alert and oriented to person, place and time.          Last Labs:  CBC -  Lab Results   Component Value Date    WBC 7.9 05/03/2022    HGB 14.4 05/03/2022    HCT 43.8 05/03/2022    MCV 90 05/03/2022     05/03/2022       CMP -  Lab Results   Component Value Date    CALCIUM 9.6 05/03/2022    PROT 7.3 05/03/2022    ALBUMIN 4.2 05/03/2022    AST 15 05/03/2022    ALT 15 05/03/2022    ALKPHOS 69 05/03/2022    BILITOT 0.4 05/03/2022       RENAL FUNCTION PANEL -   Lab Results   Component Value Date    GLUCOSE 88 05/03/2022     05/03/2022    K 3.4 (L) 05/03/2022     05/03/2022    CO2 28 05/03/2022    ANIONGAP 11 05/03/2022    BUN 21 05/03/2022    CREATININE 0.72 05/03/2022    CALCIUM 9.6 05/03/2022    ALBUMIN 4.2 05/03/2022        Lab Results   Component Value Date    BNP 28 10/26/2020       Last Cardiology Tests:  05/04/2024 - PFTs  These pulmonary function studies are within normal limits.     03/22/2024 - TTE  1. Left ventricular systolic function is normal with a 55-60% estimated ejection fraction.  2. Spectral Doppler shows an abnormal pattern of left ventricular diastolic filling.  3. There is low normal right ventricular systolic function.  4. Aortic valve stenosis is not present.  5. Lipomatous hypertrophy of the atrial septum.    03/22/2024 - Stress Test  1. SPECT stress myocardial perfusion imaging in response to pharmacologic stress demonstrate no evidence of reversible ischemia or infarction. Well-maintained left ventricular function with an LV ejection fraction of 80%.  2. No ECG changes from baseline. No clinical or electrocardiographic evidence for ischemia at maximal infusion.    10/21/2020 - CT Chest w/Contrast  1. Bilateral patchy opacities consistent with active pneumonitis/pneumonia. This can be seen with COVID-19 infection.  2. Stable 1 cm smooth nonspecific noncalcified right lower lobe pulmonary nodule compared to 04/06/2020 with no other older studies for  comparison.     04/06/2020 - TTE  1. The left ventricular systolic function is normal with a 60% estimated ejection fraction.  2. Spectral Doppler shows an impaired relaxation pattern of left ventricular diastolic filling.  3. RVSP within normal limits.  4. Aortic valve stenosis is not present.     04/06/2020 - CT Angio Chest for PE  1. No evidence of pulmonary embolism.  2. Right lower lobe 1.1 cm solitary pulmonary nodule. It is stable when compared to short-term prior imaging, CT abdomen and pelvis from November 2019; however, there is no long-term imaging available for comparison. Possible considerations include PET-CT and/or follow-up chest CT in 3-6 months.  3. No evidence of any enlarged mediastinal or hilar lymph nodes.     03/09/2020 - CT Chest w/o Contrast   Clear lungs. No acute process.     01/27/2020 - Cardiac Catheterization (LH)  No evidence of significant coronary artery disease.     01/08/2020 - Exercise Stress Test  1. Abnormal stress test due to chest pain and ventricular ectopy.  2. The inadequate level of stress was achieved. The patient achieved 1:36 minutes with a MPHR 89%  3. Very poor exercise capacity.  4. Consider additional cardiac evaluation.     Lab review: I have personally reviewed the laboratory result(s).     Assessment/Plan   1) Exertional SOB   On carvedilol 25 mg BID, HCTZ 25 mg daily, rosuvastatin 20 mg daily  L[facility] negative in Jan. 2020  No PE by CT chest April 2020  Repeat CT chest 04/2021 with bronchitis and stable right lower lobe pulmonary nodule - follows with pulmonology  TTE 03/22/2024 with LVEF 55-60%, lipomatous hypertrophy of the atrial septum  Stress 03/22/2024 negative for ischemia  PFTs 05/04/2024 WNL  Follow up in 1 year    2) Lung Nodule  Followed by Pulmonary   CT chest 04/2021 with stable RLL pulmonary nodule   CT chest 03/2023 with stable findings  Persistent exertional SOB with negative cardiac workup  Per patient, inhaler is no longer covered by insurance    Advised to followup with pulmonology to discuss alternative inhaler   PFTs 05/04/2024 WNL     3) GARY  Management per pulmonary  Does not tolerate BiPAP/CPAP      Scribe Attestation  By signing my name below, I, Paulina Angulo   attest that this documentation has been prepared under the direction and in the presence of Osvaldo Barnes MD.     Scribe Attestation  By signing my name below, I, Paulina Gonzalez   attest that this documentation has been prepared under the direction and in the presence of Osvaldo Barnes MD.

## 2025-04-25 ENCOUNTER — EVALUATION (OUTPATIENT)
Dept: PHYSICAL THERAPY | Facility: HOSPITAL | Age: 70
End: 2025-04-25
Payer: MEDICARE

## 2025-04-25 DIAGNOSIS — R26.9 GAIT ABNORMALITY: Primary | ICD-10-CM

## 2025-04-25 DIAGNOSIS — R29.898 OTHER SYMPTOMS AND SIGNS INVOLVING THE MUSCULOSKELETAL SYSTEM: ICD-10-CM

## 2025-04-25 DIAGNOSIS — R29.898 LEG WEAKNESS, BILATERAL: ICD-10-CM

## 2025-04-25 PROCEDURE — 97161 PT EVAL LOW COMPLEX 20 MIN: CPT | Mod: GP | Performed by: PHYSICAL THERAPIST

## 2025-04-25 PROCEDURE — 97110 THERAPEUTIC EXERCISES: CPT | Mod: GP | Performed by: PHYSICAL THERAPIST

## 2025-04-25 ASSESSMENT — PATIENT HEALTH QUESTIONNAIRE - PHQ9
1. LITTLE INTEREST OR PLEASURE IN DOING THINGS: NOT AT ALL
SUM OF ALL RESPONSES TO PHQ9 QUESTIONS 1 AND 2: 0
2. FEELING DOWN, DEPRESSED OR HOPELESS: NOT AT ALL

## 2025-04-25 ASSESSMENT — ENCOUNTER SYMPTOMS
DEPRESSION: 0
LOSS OF SENSATION IN FEET: 0
OCCASIONAL FEELINGS OF UNSTEADINESS: 0

## 2025-04-25 ASSESSMENT — PAIN SCALES - GENERAL: PAINLEVEL_OUTOF10: 0 - NO PAIN

## 2025-04-25 ASSESSMENT — PAIN - FUNCTIONAL ASSESSMENT: PAIN_FUNCTIONAL_ASSESSMENT: 0-10

## 2025-04-25 NOTE — PROGRESS NOTES
Physical Therapy    Physical Therapy Evaluation    Patient Name: Luciana Figueroa  MRN: [id number]  : 1955  Referring Physician: Elsie Quiles  Today's Date: 2025  Time Calculation  Start Time: 830  Stop Time: 914  Time Calculation (min): 44 min  PT Evaluation Time Entry  PT Evaluation (Low) Time Entry: 34  PT Therapeutic Procedures Time Entry  Therapeutic Exercise Time Entry: 10           General  General Comment: Visit  POC    Current Problem  Problem List Items Addressed This Visit           ICD-10-CM    Gait abnormality - Primary R26.9    Relevant Orders    Follow Up In Physical Therapy    Leg weakness, bilateral R29.898    Relevant Orders    Follow Up In Physical Therapy     Other Visit Diagnoses         Codes      Other symptoms and signs involving the musculoskeletal system     R29.898    Relevant Orders    Follow Up In Physical Therapy               SUBJECTIVE  Subjective   Patient reports lateral deviation with gait that began approximately two years ago insidiously. She reports gait is getting worse with time . This is leading to difficulty with amb and stair negotiation.  She must lean on a grocery cart to complete her shopping.  She must use a step to pattern for stair negotiation.  She has 11 steps in her home.  Pain is reported to be 0/10 with daily activities.  Patient states that their goal is to improve gait with physical therapy intervention.    Prior level of function: No functional limits    Patient's name and  were confirmed this date.        Precautions  Precautions  STEADI Fall Risk Score (The score of 4 or more indicates an increased risk of falling): 0  Precautions Comment: none       Pain  Pain Assessment: 0-10  0-10 (Numeric) Pain Score: 0 - No pain        OBJECTIVE:    Lower Extremity Strength:  LE strength not listed below is WNL  MMT 5/5 max  LEFT RIGHT   Hip Flexion  4/5  4/5        Hip Abduction  4/5  4/5   Hip Adduction  4+/5  4+/5   Knee Extension  4+/5   4+/5   Knee Flexion  4+/5  4+/5   Ankle DF  4+/5  4+/5                            Gait mechanics: Uncompensated left Trendelenburg, ER of right LE during gait cycle    Right iliac crest lower vs. Right.   Leg length discrepancy with right shorter    Pt. Provided with a right heel lift that improved gait today      Other Measures  Lower Extremity Funtional Score (LEFS): 46       TREATMENT:    EXERCISES Date 4/25/25 Date Date Date   VISIT # # 1 # # #   HEP  10'             Nustep                     Shuttle  DLP       Shuttle SLP       Shuttle TR/HR                                   Gait with long strides       Side stepping       Mini squat       Mini lunge       3 way LE raise                                                                                                      PATIENT EDUCATION:    Access Code: [email or web address]  URL: [email or web address]  Date: 04/25/2025  Prepared by: Ted Michaels    Exercises  - Seated March  - 3 x daily - 7 x weekly - 2 sets - 10 reps - 2 seconds hold  - Standing Hip Abduction  - 3 x daily - 7 x weekly - 2 sets - 10 reps - 2 seconds hold  ASSESSEMENT  Pt. Presents with leg length discrepancy as well as rigo LE weakness limiting gait and stairs    Rehab potential: excellent    PLAN  Treatment/Interventions: Gait training, Therapeutic activities, Therapeutic exercises  PT Plan: Skilled PT  PT Frequency: 2 times per week  Duration: 8 weeks    Pt. Is in agreement with PT plan.  Goals:  Active       PT Problem       PT Goal 1       Start:  04/25/25    Expected End:  06/24/25       Short tem goals to be achieve within 4 weeks:    1. Pt's will negotiate 6 stairs with a step over pattern and one handrail to improve safety in her home    Long term goals to be achieved within 8 weeks:    1.Pt's rigo LE strength will improve to  4+/5 throughout to allow for improved gait safety  2.   Pt. will present with normalized gait pattern for improved gait safety  4.   Pt's stated goal  is to improve gait

## 2025-05-06 ENCOUNTER — TREATMENT (OUTPATIENT)
Dept: PHYSICAL THERAPY | Facility: HOSPITAL | Age: 70
End: 2025-05-06
Payer: MEDICARE

## 2025-05-06 DIAGNOSIS — R29.898 LEG WEAKNESS, BILATERAL: ICD-10-CM

## 2025-05-06 DIAGNOSIS — R26.9 GAIT ABNORMALITY: Primary | ICD-10-CM

## 2025-05-06 DIAGNOSIS — R29.898 OTHER SYMPTOMS AND SIGNS INVOLVING THE MUSCULOSKELETAL SYSTEM: ICD-10-CM

## 2025-05-06 PROCEDURE — 97110 THERAPEUTIC EXERCISES: CPT | Mod: GP,CQ

## 2025-05-06 ASSESSMENT — PAIN - FUNCTIONAL ASSESSMENT: PAIN_FUNCTIONAL_ASSESSMENT: 0-10

## 2025-05-06 ASSESSMENT — PAIN SCALES - GENERAL: PAINLEVEL_OUTOF10: 5 - MODERATE PAIN

## 2025-05-06 NOTE — PROGRESS NOTES
Physical Therapy    Physical Therapy Treatment    Patient Name: Luciana Figueroa  MRN: [id number]  : 1955   Today's Date: 2025  Time Calculation  Start Time: 1515  Stop Time: 1553  Time Calculation (min): 38 min     PT Therapeutic Procedures Time Entry  Therapeutic Exercise Time Entry: 38                 Visit Number:     Current Problem  Problem List Items Addressed This Visit           ICD-10-CM    Gait abnormality - Primary R26.9    Leg weakness, bilateral R29.898     Other Visit Diagnoses         Codes      Other symptoms and signs involving the musculoskeletal system     R29.898             Subjective   Pt reports compliance with HEP and no recent falls. Pt states has been wearing the heel lift in her R shoe and feels it is helping.   Precautions  Precautions  Precautions Comment: none    Pain  Pain Assessment: 0-10  0-10 (Numeric) Pain Score: 5 - Moderate pain  Pain Location: Back  Pain Orientation: Lower      Objective   Pt demonstrates trendelenburg gait pattern   Treatments:  EXERCISES Date 25 Date 2025 Date Date   VISIT # # 1 #2  # #   HEP  10' Reviewed             Nustep  L2 10min                   Shuttle  DLP  4B 2x10     Shuttle SLP  3B [id number]      Shuttle TR/HR                     Gait with long strides  2 laps Artis UE      Side stepping  2 laps Artis      Mini squat  ^ R knee pain      Mini lunge       3 way LE raise  X10ea Artis      Retro amb   2 laps Artis UE     Hip hiking   L 2x10 2sec hold                                     Assessment:   Pt tolerated all exercises well with minimal difficulty reporting no change in pain through out session. Pt verbalized good understanding of HEP without questions this date.     Plan:  Monitor pt response to session and progress as tolerated.   OP PT Plan  Treatment/Interventions: Gait training, Therapeutic activities, Therapeutic exercises  PT Plan: Skilled PT  PT Frequency: 2 times per week  Duration: 8 weeks    Goals:  Active       PT  Problem       PT Goal 1       Start:  04/25/25    Expected End:  06/24/25       Short tem goals to be achieve within 4 weeks:    1. Pt's will negotiate 6 stairs with a step over pattern and one handrail to improve safety in her home    Long term goals to be achieved within 8 weeks:    1.Pt's rigo LE strength will improve to  4+/5 throughout to allow for improved gait safety  2.   Pt. will present with normalized gait pattern for improved gait safety  4.   Pt's stated goal is to improve gait

## 2025-05-09 ENCOUNTER — TREATMENT (OUTPATIENT)
Dept: PHYSICAL THERAPY | Facility: HOSPITAL | Age: 70
End: 2025-05-09
Payer: MEDICARE

## 2025-05-09 DIAGNOSIS — R26.9 GAIT ABNORMALITY: Primary | ICD-10-CM

## 2025-05-09 DIAGNOSIS — R29.898 OTHER SYMPTOMS AND SIGNS INVOLVING THE MUSCULOSKELETAL SYSTEM: ICD-10-CM

## 2025-05-09 DIAGNOSIS — R29.898 LEG WEAKNESS, BILATERAL: ICD-10-CM

## 2025-05-09 PROCEDURE — 97110 THERAPEUTIC EXERCISES: CPT | Mod: GP | Performed by: PHYSICAL THERAPIST

## 2025-05-09 ASSESSMENT — PAIN - FUNCTIONAL ASSESSMENT: PAIN_FUNCTIONAL_ASSESSMENT: 0-10

## 2025-05-09 ASSESSMENT — PAIN SCALES - GENERAL: PAINLEVEL_OUTOF10: 5 - MODERATE PAIN

## 2025-05-09 NOTE — PROGRESS NOTES
Diagnosis:   Spinal stenosis of lumbar region without neurogenic claudication (M48.061)  Hip tightness (R29.898)  Weakness of right lower extremity (R29.898)  Acute pain of left knee (K75.649)      Referring Provider: Rocio Ndiaye  Date of Evaluation:    6/20/2023    Precautions:  None Next MD visit:   Lumbar Epidural Steroid Injection scheduled 7/11/2023  Date of Surgery: n/a   Insurance Primary/Secondary: Chase [employer] / N/A     # Auth Visits: 60v limit, no auth            Subjective: was pretty loose when he went home, felt pretty good, did more ex's later the day; sat morning went to stretch lab - when he reaches down to don the socks the back spasmed (did not travel down his leg) - that really sets it off - loosened up during day, took muscle relaxer sunday - rode bike, did PT stretches 2x - son adjusted him last night which helped; this morning didn't do anything yet as he knew he was coming; some soreness to R - muscle? Longer stretches feeling \"not bad\"; didn't take pain meds today, yesterday only took tylenol in evening. Got decompression lounge chair/ recliner, two outside recliners (1 antigravity), also ordered inversion table     Pain: 3/10 - R side lumbar      Objective: See Flowsheet for details  6/23/2023:  Palpation: increased tension to R QL & R LPSM. ROM: Continues with restricted R hip ER & IR, improved grossly with mobs/ MWM. Lumbar flex iniitially mod restricted improved to min restricted by end of visit. Lumbar ext micheal restricted/ unable due to increased pain. Gait: by end of visit pt had reduced forward lean      Assessment: Pt amb into session with more upright gait pattern with minimal antalgia. Pt reported relief with manual treatment techniques provided. Initial tension to R piriformis & glute med reduced with MFR/ sustained hold STM.       Goals: (to be met in 8 visits)  Pt will improve transversus abdominis recruitment to perform proper isometric contraction without requiring verbal or tactile cuing Physical Therapy    Physical Therapy Treatment    Patient Name: Luciana Figueroa  MRN: [id number]  : 1955   Adan-Elsie Vargas  Today's Date: 2025  Time Calculation  Start Time: 0700  Stop Time: 07  Time Calculation (min): 43 min  PT Therapeutic Procedures Time Entry  Therapeutic Exercise Time Entry: 43           General  General Comment: Visit 3/16 POC  Visit #3     Current Problem  Problem List Items Addressed This Visit           ICD-10-CM    Gait abnormality - Primary R26.9    Leg weakness, bilateral R29.898     Other Visit Diagnoses         Codes      Other symptoms and signs involving the musculoskeletal system     R29.898                 Subjective   Pt. Reports that he is doing well   Pt.'s name and  were confirmed this date.    Pt. Reports compliance with HEP.    Precautions  Precautions  Precautions Comment: none    Pain  Pain Assessment: 0-10  0-10 (Numeric) Pain Score: 5 - Moderate pain  Pain Location: Back    Objective          Pt's name and  were confirmed today.      Treatments:    EXERCISES Date 25 Date 2025 Date 25 Date   VISIT # # 1 #2  #3 #   HEP  10' Reviewed             Nustep  L2 10min L3 10min                  Shuttle  DLP  4B 2x10  5B 2x10    Shuttle SLP  3B [id number]  4B [id number]     Shuttle TR/HR                     Gait with long strides  2 laps Rigo UE   5 laps Rigo UE     Side stepping  2 laps Rigo   5 laps Rigo UE     Mini squat  ^ R knee pain      Mini lunge       3 way LE raise  X10ea Rigo   X10ea Rigo     Retro amb   2 laps Rigo UE  2 laps Rigo UE    Hip hiking   L 2x10 2sec hold  rigo 2x10 2sec hold                                    PATIENT EDUCATION:    Access Code: [email or web address]  URL: [email or web address]  Date: 2025  Prepared by: Ted Michaels    Exercises  - Seated March  - 3 x daily - 7 x weekly - 2 sets - 10 reps - 2 seconds hold  - Standing Hip Abduction  - 3 x daily - 7 x weekly - 2 sets - 10 reps - 2 seconds hold    Assessment:    Pt. Fatigued with there ex    Plan:  OP PT Plan  Treatment/Interventions: Gait training, Therapeutic activities, Therapeutic exercises  PT Plan: Skilled PT  PT Frequency: 2 times per week  Duration: 8 weeks    Goals:  Active       PT Problem       PT Goal 1       Start:  04/25/25    Expected End:  06/24/25       Short tem goals to be achieve within 4 weeks:    1. Pt's will negotiate 6 stairs with a step over pattern and one handrail to improve safety in her home    Long term goals to be achieved within 8 weeks:    1.Pt's rigo LE strength will improve to  4+/5 throughout to allow for improved gait safety  2.   Pt. will present with normalized gait pattern for improved gait safety  4.   Pt's stated goal is to improve gait              to promote advancement of therex   Pt will demonstrate good understanding of proper posture and body mechanics to decrease pain and improve spinal safety   Pt will report the ability to tolerate sitting > 60 minutes with pain at worst 4/10 for driving & work duties. Pt will improve B hip abduction strength to 4+/5 for community ambulation  Pt will improve R hip IR & ER ROM to mod restricted or better for ability to don R sock & shoe with improved ease  Pt will report ability to sleep at night waking max of 1x for improved ability to focus & function daytime. Pt will be independent and compliant with comprehensive HEP to maintain progress achieved in PT     Plan: Assess for update to symptoms & progress as indicated. Pt is aware PT will be out of office from 6/28 & returning 7/3 & will contact central scheduling if any issues arise during this time. Next appointment confirmed. Goal next session: active exercise progression as able. Date: 6/23/2023  TX#: 2/8 Date: 6/26/2023  TX#: 3/8 Date:                 TX#: 4/ Date:                 TX#: 5/ Date: Tx#: 6/   Manual Therapy: 24'  R hip inf, lat mobs gr II-III/ MWM flex, IR, ER (supine)  R lumbar (emphasis L5-S1) unilateral distraction gr II-III (L sidelying - neutral)  STM R QL, LPSM (L sidelying)   Therex: 16'  Trial of lumbar positional distraction stretch over L bolster - defer  Lumbar decompressive stretch supine 90/90 position x 8' - instruction in parameters for adding to HEP - no increased peripheralization  Education: deferring prolonged walking (ext) as exercise for now focusing on more neutral to flexion based cardio with goal of incorporating as symptoms reduce.  Manual Therapy: 28'  R hip inf, lat mobs gr II-III/ MWM flex, IR, ER (supine)  R lumbar (emphasis L5-S1) unilateral distraction gr II-III (L sidelying - neutral)  STM R Piriformis, Glute Med, QL, LPSM (L sidelying)  Therex: 8'  LTR x 5'  PPT x 30      HEP:  PPT in hooklying, LTR.  6/23/2023: lumbar decompressive stretch supine 90/90 position    Charges: Manual x 2, Therex x 1       Total Timed Treatment: 43 min  Total Treatment Time: 43 min

## 2025-05-13 ENCOUNTER — TREATMENT (OUTPATIENT)
Dept: PHYSICAL THERAPY | Facility: HOSPITAL | Age: 70
End: 2025-05-13
Payer: MEDICARE

## 2025-05-13 DIAGNOSIS — R29.898 LEG WEAKNESS, BILATERAL: ICD-10-CM

## 2025-05-13 DIAGNOSIS — R29.898 OTHER SYMPTOMS AND SIGNS INVOLVING THE MUSCULOSKELETAL SYSTEM: ICD-10-CM

## 2025-05-13 DIAGNOSIS — R26.9 GAIT ABNORMALITY: Primary | ICD-10-CM

## 2025-05-13 PROCEDURE — 97110 THERAPEUTIC EXERCISES: CPT | Mod: GP,CQ

## 2025-05-13 ASSESSMENT — PAIN - FUNCTIONAL ASSESSMENT: PAIN_FUNCTIONAL_ASSESSMENT: 0-10

## 2025-05-13 ASSESSMENT — PAIN SCALES - GENERAL: PAINLEVEL_OUTOF10: 6

## 2025-05-13 NOTE — PROGRESS NOTES
Physical Therapy    Physical Therapy Treatment    Patient Name: Luciana Figueroa  MRN: [id number]  : 1955   Today's Date: 2025  Time Calculation  Start Time: 844  Stop Time: 923  Time Calculation (min): 39 min     PT Therapeutic Procedures Time Entry  Therapeutic Exercise Time Entry: 39                 Visit Number: 4    Current Problem  Problem List Items Addressed This Visit           ICD-10-CM    Gait abnormality - Primary R26.9    Leg weakness, bilateral R29.898     Other Visit Diagnoses         Codes      Other symptoms and signs involving the musculoskeletal system     R29.898             Subjective   Pt reports compliance with HEP and no recent falls states she has been focusing on her walking lately.   Precautions  Precautions  Precautions Comment: none    Pain  Pain Assessment: 0-10  0-10 (Numeric) Pain Score: 6  Pain Location: Back  Pain Orientation: Lower      Objective   Pt demonstrates trendelenburg gait pattern with Artis pronation and eversion. Pt demonstrates ability to correct Artis toe eversion with cueing this date  Treatments:  EXERCISES Date 25 Date 2025 Date 25 Date 2025   VISIT # # 1 #2  #3 #4   HEP  10' Reviewed             Nustep  L2 10min L3 10min L3 10min                 Shuttle  DLP  4B 2x10  5B 2x10 5B 3x10   Shuttle SLP  3B [id number]  4B [id number]  4B 1r56Qhk    Shuttle TR/HR                     Gait with long strides  2 laps Artis UE   5 laps Artis UE  5 laps Artis UE   Side stepping  2 laps Artis   5 laps Artis UE  3 laps 1 UE   Mini squat  ^ R knee pain      Mini lunge       3 way LE raise  X10ea Artis   X10ea Artis  2x10ea Artis    Retro amb   2 laps Artis UE  2 laps Artis UE 2 laps Artis UE   Hip hiking   L 2x10 2sec hold  artis 2x10 2sec hold Artis 2x10 2sec hold                                  Assessment:   Pt tolerated all exercises well with minimal difficulty reporting no pain at end of session. Pt tolerated increased set of 3way hip and DLP on shuttle this date.      Plan:  Continue to improve Artis LE and core strength as tolerated to improve functional abilities with decreased pain.   OP PT Plan  Treatment/Interventions: Gait training, Therapeutic activities, Therapeutic exercises  PT Plan: Skilled PT  PT Frequency: 2 times per week  Duration: 8 weeks    Goals:  Active       PT Problem       PT Goal 1       Start:  04/25/25    Expected End:  06/24/25       Short tem goals to be achieve within 4 weeks:    1. Pt's will negotiate 6 stairs with a step over pattern and one handrail to improve safety in her home    Long term goals to be achieved within 8 weeks:    1.Pt's artis LE strength will improve to  4+/5 throughout to allow for improved gait safety  2.   Pt. will present with normalized gait pattern for improved gait safety  4.   Pt's stated goal is to improve gait

## 2025-05-16 ENCOUNTER — TREATMENT (OUTPATIENT)
Dept: PHYSICAL THERAPY | Facility: HOSPITAL | Age: 70
End: 2025-05-16
Payer: MEDICARE

## 2025-05-16 DIAGNOSIS — R29.898 OTHER SYMPTOMS AND SIGNS INVOLVING THE MUSCULOSKELETAL SYSTEM: ICD-10-CM

## 2025-05-16 DIAGNOSIS — R26.9 GAIT ABNORMALITY: Primary | ICD-10-CM

## 2025-05-16 DIAGNOSIS — R29.898 LEG WEAKNESS, BILATERAL: ICD-10-CM

## 2025-05-16 PROCEDURE — 97110 THERAPEUTIC EXERCISES: CPT | Mod: GP | Performed by: PHYSICAL THERAPIST

## 2025-05-16 ASSESSMENT — PAIN SCALES - GENERAL: PAINLEVEL_OUTOF10: 5 - MODERATE PAIN

## 2025-05-16 ASSESSMENT — PAIN - FUNCTIONAL ASSESSMENT: PAIN_FUNCTIONAL_ASSESSMENT: 0-10

## 2025-05-16 NOTE — PROGRESS NOTES
Physical Therapy    Physical Therapy Treatment    Patient Name: Luciana Figueroa  MRN: [id number]  : 1955   Adan-Elsie Vargas  Today's Date: 2025                 General  General Comment: Visit  POC  Visit #5     Current Problem  Problem List Items Addressed This Visit           ICD-10-CM    Gait abnormality - Primary R26.9    Leg weakness, bilateral R29.898     Other Visit Diagnoses         Codes      Other symptoms and signs involving the musculoskeletal system     R29.898                 Subjective   Pt. Reports that she had some low level left knee discomfort after treatment last visit   Pt.'s name and  were confirmed this date.    Pt. Reports compliance with HEP.    Precautions  Precautions  Precautions Comment: none    Pain  Pain Assessment: 0-10  0-10 (Numeric) Pain Score: 5 - Moderate pain  Pain Location: Knee  Pain Orientation: Left    Objective      Lower Extremity Strength:  LE strength not listed below is WNL  MMT 5/5 max  LEFT RIGHT   Hip Flexion  4/5  4+/5        Hip Abduction  4+/5  4+/5   Hip Adduction  4+/5  4+/5   Knee Extension  4+/5  4+/5   Knee Flexion  4+/5  4+/5   Ankle DF  4+/5  4+/5            Pt's name and  were confirmed today.        Treatments:    EXERCISES Date 25 Date 2025 Date 25 Date 2025  Date 25   VISIT # # 1 #2  #3 #4  #5   HEP  10' Reviewed               Nustep  L2 10min L3 10min L3 10min  L3 10min                   Shuttle  DLP  4B 2x10  5B 2x10 5B 3x10  5B 3x10   Shuttle SLP  3B [id number]  4B [id number]  4B 9m56Jqa   4B 6e58Hxw    Shuttle TR/HR                        Gait with long strides  2 laps Artis UE   5 laps Artis UE  5 laps Artis UE  5 laps Artis UE   Side stepping  2 laps Artis   5 laps Artis UE  3 laps 1 UE  5 laps Artis UE   Mini squat  ^ R knee pain       Mini lunge        3 way LE raise  X10ea Artis   X10ea Artis  2x10ea Artis   2x10ea Artis    Retro amb   2 laps Artis UE  2 laps Artis UE 2 laps Artis UE  5 laps Artis UE   Hip hiking   L 2x10 2sec hold   artis 2x10 2sec hold Artis 2x10 2sec hold   Artis 2x10 2sec hold                                        PATIENT EDUCATION:    Access Code: [email or web address]  URL: [email or web address]  Date: 04/25/2025  Prepared by: Ted Michaels    Exercises  - Seated March  - 3 x daily - 7 x weekly - 2 sets - 10 reps - 2 seconds hold  - Standing Hip Abduction  - 3 x daily - 7 x weekly - 2 sets - 10 reps - 2 seconds hold    Assessment:   Pt. Shows significant decrease in lateral deviation with forward and lateral amb with artis UE support in parallel bars. She does, however, show increased lateral sway artis when attempting these activities with single UE support    No increased pain with treatment today    Plan:  OP PT Plan  Treatment/Interventions: Gait training, Therapeutic activities, Therapeutic exercises  PT Plan: Skilled PT  PT Frequency: 2 times per week  Duration: 8 weeks    Goals:  Active       PT Problem       PT Goal 1       Start:  04/25/25    Expected End:  06/24/25       Short tem goals to be achieve within 4 weeks:    1. Pt's will negotiate 6 stairs with a step over pattern and one handrail to improve safety in her home    Long term goals to be achieved within 8 weeks:    1.Pt's artis LE strength will improve to  4+/5 throughout to allow for improved gait safety  2.   Pt. will present with normalized gait pattern for improved gait safety  4.   Pt's stated goal is to improve gait

## 2025-05-20 ENCOUNTER — TREATMENT (OUTPATIENT)
Dept: PHYSICAL THERAPY | Facility: HOSPITAL | Age: 70
End: 2025-05-20
Payer: MEDICARE

## 2025-05-20 ENCOUNTER — OFFICE VISIT (OUTPATIENT)
Dept: PULMONOLOGY | Facility: HOSPITAL | Age: 70
End: 2025-05-20
Payer: MEDICARE

## 2025-05-20 VITALS
DIASTOLIC BLOOD PRESSURE: 84 MMHG | SYSTOLIC BLOOD PRESSURE: 124 MMHG | BODY MASS INDEX: 38.77 KG/M2 | RESPIRATION RATE: 18 BRPM | TEMPERATURE: 96.8 F | WEIGHT: 192.3 LBS | HEIGHT: 59 IN | HEART RATE: 75 BPM | OXYGEN SATURATION: 93 %

## 2025-05-20 DIAGNOSIS — J45.40 MODERATE PERSISTENT ASTHMA WITHOUT COMPLICATION (HHS-HCC): Primary | ICD-10-CM

## 2025-05-20 DIAGNOSIS — K21.9 GASTROESOPHAGEAL REFLUX DISEASE WITHOUT ESOPHAGITIS: ICD-10-CM

## 2025-05-20 DIAGNOSIS — R91.1 SOLITARY PULMONARY NODULE: ICD-10-CM

## 2025-05-20 DIAGNOSIS — R26.9 GAIT ABNORMALITY: Primary | ICD-10-CM

## 2025-05-20 DIAGNOSIS — R29.898 OTHER SYMPTOMS AND SIGNS INVOLVING THE MUSCULOSKELETAL SYSTEM: ICD-10-CM

## 2025-05-20 DIAGNOSIS — J30.2 PERENNIAL ALLERGIC RHINITIS WITH SEASONAL VARIATION: ICD-10-CM

## 2025-05-20 DIAGNOSIS — F41.9 CHRONIC ANXIETY: ICD-10-CM

## 2025-05-20 DIAGNOSIS — G47.33 OSA (OBSTRUCTIVE SLEEP APNEA): ICD-10-CM

## 2025-05-20 DIAGNOSIS — J30.89 PERENNIAL ALLERGIC RHINITIS WITH SEASONAL VARIATION: ICD-10-CM

## 2025-05-20 DIAGNOSIS — R29.898 LEG WEAKNESS, BILATERAL: ICD-10-CM

## 2025-05-20 PROCEDURE — 99214 OFFICE O/P EST MOD 30 MIN: CPT | Performed by: INTERNAL MEDICINE

## 2025-05-20 PROCEDURE — 1159F MED LIST DOCD IN RCRD: CPT | Performed by: INTERNAL MEDICINE

## 2025-05-20 PROCEDURE — 3008F BODY MASS INDEX DOCD: CPT | Performed by: INTERNAL MEDICINE

## 2025-05-20 PROCEDURE — 97110 THERAPEUTIC EXERCISES: CPT | Mod: GP | Performed by: PHYSICAL THERAPIST

## 2025-05-20 RX ORDER — MOMETASONE FUROATE AND FORMOTEROL FUMARATE DIHYDRATE 200; 5 UG/1; UG/1
2 AEROSOL RESPIRATORY (INHALATION)
Qty: 13 G | Refills: 11 | Status: SHIPPED | OUTPATIENT
Start: 2025-05-20

## 2025-05-20 RX ORDER — CETIRIZINE HYDROCHLORIDE 5 MG/1
5 TABLET ORAL DAILY
Qty: 30 TABLET | Refills: 11 | Status: SHIPPED | OUTPATIENT
Start: 2025-05-20 | End: 2026-05-20

## 2025-05-20 ASSESSMENT — PATIENT HEALTH QUESTIONNAIRE - PHQ9
SUM OF ALL RESPONSES TO PHQ9 QUESTIONS 1 AND 2: 0
2. FEELING DOWN, DEPRESSED OR HOPELESS: NOT AT ALL
1. LITTLE INTEREST OR PLEASURE IN DOING THINGS: NOT AT ALL

## 2025-05-20 ASSESSMENT — ENCOUNTER SYMPTOMS
COUGH: 0
RHINORRHEA: 0
LOSS OF SENSATION IN FEET: 0
FATIGUE: 0
CHILLS: 0
WHEEZING: 0
OCCASIONAL FEELINGS OF UNSTEADINESS: 0
DEPRESSION: 0
FEVER: 0
SHORTNESS OF BREATH: 1
UNEXPECTED WEIGHT CHANGE: 0

## 2025-05-20 ASSESSMENT — PAIN SCALES - GENERAL: PAINLEVEL_OUTOF10: 3

## 2025-05-20 ASSESSMENT — COLUMBIA-SUICIDE SEVERITY RATING SCALE - C-SSRS
1. IN THE PAST MONTH, HAVE YOU WISHED YOU WERE DEAD OR WISHED YOU COULD GO TO SLEEP AND NOT WAKE UP?: NO
6. HAVE YOU EVER DONE ANYTHING, STARTED TO DO ANYTHING, OR PREPARED TO DO ANYTHING TO END YOUR LIFE?: NO
2. HAVE YOU ACTUALLY HAD ANY THOUGHTS OF KILLING YOURSELF?: NO

## 2025-05-20 ASSESSMENT — PAIN - FUNCTIONAL ASSESSMENT: PAIN_FUNCTIONAL_ASSESSMENT: 0-10

## 2025-05-20 NOTE — PROGRESS NOTES
[facility]  [address] [place], [place], [address]  [phone]        May 12, 2017      Inga Covarrubias  [address] [facility] [address]        Dear Parent of Inga,     Your lab results are as below:   1)  TSH (thyroid level) 1.40 which is normal (range 0.4-4)   2)  CBC (checks for anemia and infection) was normal.   3)  Glucose was normal at 85 (normal fasting is <100).   4)  Your vitamin d level was low at 13, normal is 20-75. Having a low vitamin D level can cause body aches and fatigue.  I would like you to take vitamin D 50,000 iu every week for the next 12 weeks.  I have sent in a prescriptions for vitamin D to your pharmacy.  After 12 weeks I would like you to come in for a lab only appointment to recheck your vitamin D level, you do not have to be fasting for that lab appointment.     Results for orders placed or performed in visit on 05/08/17   TSH with free T4 reflex   Result Value Ref Range    TSH 1.40 0.40 - 4.00 mU/L   CBC with platelets differential   Result Value Ref Range    WBC 10.7 4.0 - 11.0 10e9/L    RBC Count 4.49 3.7 - 5.3 10e12/L    Hemoglobin 13.4 11.7 - 15.7 g/dL    Hematocrit 39.5 35.0 - 47.0 %    MCV 88 77 - 100 fl    MCH 29.8 26.5 - 33.0 pg    MCHC 33.9 31.5 - 36.5 g/dL    RDW 12.7 10.0 - 15.0 %    Platelet Count 256 150 - 450 10e9/L    Diff Method Automated Method     % Neutrophils 65.3 %    % Lymphocytes 25.6 %    % Monocytes 6.9 %    % Eosinophils 2.0 %    % Basophils 0.2 %    Absolute Neutrophil 7.0 1.3 - 7.0 10e9/L    Absolute Lymphocytes 2.7 1.0 - 5.8 10e9/L    Absolute Monocytes 0.7 0.0 - 1.3 10e9/L    Absolute Eosinophils 0.2 0.0 - 0.7 10e9/L    Absolute Basophils 0.0 0.0 - 0.2 10e9/L   Basic metabolic panel   Result Value Ref Range    Sodium 139 133 - 143 mmol/L    Potassium 4.1 3.4 - 5.3 mmol/L    Chloride 106 96 - 110 mmol/L    Carbon Dioxide 23 20 - 32 mmol/L    Anion Gap 10 3 - 14 mmol/L    Glucose 85 70 - 99 mg/dL    Urea Nitrogen 11 7 - 19 mg/dL  Physical Therapy    Physical Therapy Treatment    Patient Name: Luciana Figueroa  MRN: [id number]  : 1955   Adan-Elsie Vargas  Today's Date: 2025  Time Calculation  Start Time: 1050  Stop Time: 1130  Time Calculation (min): 40 min  PT Therapeutic Procedures Time Entry  Therapeutic Exercise Time Entry: 40           General  General Comment: Visit  POC  Visit #6     Current Problem  Problem List Items Addressed This Visit           ICD-10-CM    Gait abnormality - Primary R26.9    Leg weakness, bilateral R29.898     Other Visit Diagnoses         Codes      Other symptoms and signs involving the musculoskeletal system     R29.898                 Subjective   Pt. Reports that she is walking better   Pt.'s name and  were confirmed this date.    Pt. Reports compliance with HEP.    Precautions  Precautions  Precautions Comment: none    Pain  Pain Assessment: 0-10  0-10 (Numeric) Pain Score: 3  Pain Location: Knee  Pain Orientation: Left    Objective      Gait has improved to show decreased ER of right LE     Pt's name and  were confirmed today.        Treatments:    EXERCISES Date 25 Date 2025 Date 25 Date 2025  Date 25 Date 25   VISIT # # 1 #2  #3 #4  #5  #6   HEP  10' Reviewed                 Nustep  L2 10min L3 10min L3 10min  L3 10min  L4 10min                     Shuttle  DLP  4B 2x10  5B 2x10 5B 3x10  5B 3x10  5B 3x10   Shuttle SLP  3B 6i26Vcd  4B 3h35Mgw  4B 3n99Gyq   4B 8p84Lds   4B 1i60Jpd    Shuttle TR/HR                           Gait with long strides  2 laps Rigo UE   5 laps Rigo UE  5 laps Rigo UE  5 laps Rigo UE  5 laps Rigo UE   Side stepping  2 laps Rigo   5 laps Rigo UE  3 laps 1 UE  5 laps Rigo UE  5 laps Rigo UE   Mini squat  ^ R knee pain        Mini lunge         3 way LE raise  X10ea Rigo   X10ea Rigo  2x10ea Rigo   2x10ea Rigo   2x10ea Rigo    Retro amb   2 laps Rigo UE  2 laps Rigo UE 2 laps Rigo UE  5 laps Rigo UE  5 laps Rigo UE   Hip hiking   L 2x10 2sec hold  rigo     Creatinine 0.53 0.39 - 0.73 mg/dL    GFR Estimate  mL/min/1.7m2     GFR not calculated, patient <16 years old.  Non  GFR Calc      GFR Estimate If Black  mL/min/1.7m2     GFR not calculated, patient <16 years old.   GFR Calc      Calcium 9.2 9.1 - 10.3 mg/dL   Vitamin D Deficiency   Result Value Ref Range    Vitamin D Deficiency screening 13 (L) 20 - 75 ug/L     If you have any questions do not hesitate to call the clinic to discuss the results with me further.     Sincerely,     Susan Haase, CNP                  2x10 2sec hold Artis 2x10 2sec hold   Artis 2x10 2sec hold   Artis 2x10 2sec hold             Amb in villareal with right shoulder to wall      50' x4                         PATIENT EDUCATION:    Access Code: [email or web address]  URL: [email or web address]  Date: 04/25/2025  Prepared by: Ted Michaels    Exercises  - Seated March  - 3 x daily - 7 x weekly - 2 sets - 10 reps - 2 seconds hold  - Standing Hip Abduction  - 3 x daily - 7 x weekly - 2 sets - 10 reps - 2 seconds hold    Assessment:   Pt. Shows significant decrease in uncompensated Trendelenburg with amb with right shoulder toward wall    No increased pain with treatment today           Plan:  OP PT Plan  Treatment/Interventions: Gait training, Therapeutic activities, Therapeutic exercises  PT Plan: Skilled PT  PT Frequency: 2 times per week  Duration: 8 weeks    Goals:  Active       PT Problem       PT Goal 1       Start:  04/25/25    Expected End:  06/24/25       Short tem goals to be achieve within 4 weeks:    1. Pt's will negotiate 6 stairs with a step over pattern and one handrail to improve safety in her home    Long term goals to be achieved within 8 weeks:    1.Pt's artis LE strength will improve to  4+/5 throughout to allow for improved gait safety  2.   Pt. will present with normalized gait pattern for improved gait safety  4.   Pt's stated goal is to improve gait

## 2025-05-20 NOTE — PROGRESS NOTES
"Subjective   Patient ID: Luciana Figueroa is a 69 y.o. female who presents for asthma follow up.     Asthma  She complains of shortness of breath. There is no cough (denies hemoptysis.) or wheezing. Pertinent negatives include no chest pain, fever, postnasal drip or rhinorrhea. Her past medical history is significant for asthma.   : Patient has PMH of asthma, covid, lung nodule, allergic rhinitis, anxiety, depression, and HTN. She states that her breathing is stable and she only uses Xopenex with weather changes mostly. She states Montelukast helps improve s/s. She has GARY but intolerant with CPAP.  She states that her O2 tests are normal but at home her POX ranges from 88 to 92%. She notes SOB with activity and has remained at rest and activity same. She states when she eats she has to stop and breathe. She states she is not taking any inhalers because insurance quit paying for it since March 2024.     She is here for follow up today after 6 months. She states she still feel SOB off and on \"half and half.\" She states SOB is with doing strenuous activity and sometimes with daily chores. She denies coughing or chest pain. She states she wheezes when she walks. She has mild sinus congestion. She is only taking Albuterol inhaler EOD. She is not taking Dulera that was prescribed on the last visit in Nov 2024 since she did not like wixela. She reports she still has anxiety and not worse or better. She states she has seen Psychiatrist for anxiety because anxiety worsens her breathing. States psychiatrist also feels that anxiety is causing her breathing issues.     Review of Systems   Constitutional:  Negative for chills, fatigue, fever and unexpected weight change.   HENT:  Negative for congestion, postnasal drip and rhinorrhea.    Respiratory:  Positive for shortness of breath. Negative for cough (denies hemoptysis.) and wheezing.    Cardiovascular:  Negative for chest pain and leg swelling.   All other systems reviewed " and are negative.      Objective   Physical Exam  Vitals reviewed.   Constitutional:       Appearance: Normal appearance.   HENT:      Head: Normocephalic.   Cardiovascular:      Rate and Rhythm: Normal rate and regular rhythm.   Pulmonary:      Effort: Pulmonary effort is normal.      Breath sounds: Normal breath sounds.   Skin:     General: Skin is warm and dry.   Neurological:      Mental Status: She is alert.       Assessment/Plan   1. Exercise Induced Bronchial Asthma (EIA)  2. Allergic rhinitis  3. Obstructive sleep apnea, noncompliant   4. Right lower lobe lung nodule stable since Dec 2019  5. Anxiety disorder  6. Obesity, BMI 37.3 (191)  7. Stage I diastolic dysfunction  8. Hypoxia, Obesity hypoventilation   9. GERD/Hiatal hernia and PUD     Plan:  -PFTs discussed 5/3/24: FEV1 98%, no BDR, normal volumes and DLCO normal.     -She was unable to afford Breo and has tried multiple other maintenance inhalers which did not help. Will continue on Xopenex prn. Tried wixela which has helped somewhat but does not like DPI but she only uses once a day only. She was advised to start Dulera but did not take it.   -Normal ECHO and Stress Test March 2024.   -DCed Montelukast HS as symptoms are better and due to underlying anxiety. Start on cetirizine daily during Spring season for postnasal drip.  -CT chest for follow up August 20, 24 on lung nodule is stable since Nov/2019/April 2020.   -Continue to follow up with psychiatry. Continue buspirone for anxiety at 15 mg BID.   -Weight loss as discussed. Recommend weight management consult.  -Cont pantoprazole in AM and change Famotidine HS to PRN.     -HST discussed showing GARY that is severe she has been unable to tolerate CPAP due to anxiety. She states she sent CPAP back to [facility] due to intolerance. I discussed risks of untreated GARY at length. We discussed risk of cerebrovascular, cardiovascular and metabolic morbidity and mortality associated with untreated GARY. SHe states  there is no way she will use CPAP. She has been evaluated by ENT for inspire and not deemed a candidate due to BMI >35, oral appliance not indicated and not interested in that. She will work on weight loss and sleep on her side.    -6MWT with exertional hypoxia of 91% but did not require O2 supplementation. Suspect this is secondary to OHS. Repeat 6MWT without need for O2 supplementation with activity with lowest SpO2 92%.  -Iron panel with Iron 61 and ferritin 19. Recommend iron supplementation also from RLS standpoint.   -Discussed weight loss program such as silver sneaker, increase physical activity regimen  -CT chest for follow up on RLL lung nodule 1.1 cm. Has a family hx of lung cancer.   -She refuses vaccinations.     Her dyspnea appears to be related to her chronic anxiety, physical deconditioning from inactivity and weight gain with BMI now 37.3. Her PFTs are normal and cardiac work up is normal. We have attempted optimization of Rx from Exercise Induced Asthma standpoint but has limitation to take inhalers due to cost. I advised her to take Dulera MDI as that is the only MDI covered.  Advised to continue with Rx with for EIA, weight loss and increase physical activity through silver sneaker program that she is attending. We discussed dietary management for weight loss and she has been to the weight management center. Continue Buspirone and follow up with mental health counselor.     Follow up in 9 to 12 months. Continue Dulera, Albuterol prn, cetirizine and buspirone. Continue weight loss efforts.

## 2025-05-20 NOTE — PATIENT INSTRUCTIONS
1. Take Dulera 2 puffs twice daily and if any issues with this inhaler, go back to Wixela and notify us. Take Albuterol 2 puffs as needed for shortness of breath, wheezing or chest tightness and coughing spells. Please take 2 puffs about 10 minutes before exercise.     2. Continue buspirone 15 mg 1 tablet twice daily for anxiety.   3. Weight management as discussed. Establish a physical activity regimen. Follow up with weight management center.  4. Call with any questions or concerns. Please notify if any worsening of respiratory issues.   5. Take Cetirizine 1 tablet daily for sinus symptoms.  6. Follow up with me in 9 months. If any worsening of symptoms, please contact our office to make a sooner appointment.

## 2025-05-23 ENCOUNTER — TREATMENT (OUTPATIENT)
Dept: PHYSICAL THERAPY | Facility: HOSPITAL | Age: 70
End: 2025-05-23
Payer: MEDICARE

## 2025-05-23 DIAGNOSIS — R29.898 LEG WEAKNESS, BILATERAL: ICD-10-CM

## 2025-05-23 DIAGNOSIS — R26.9 GAIT ABNORMALITY: Primary | ICD-10-CM

## 2025-05-23 DIAGNOSIS — R29.898 OTHER SYMPTOMS AND SIGNS INVOLVING THE MUSCULOSKELETAL SYSTEM: ICD-10-CM

## 2025-05-23 PROCEDURE — 97110 THERAPEUTIC EXERCISES: CPT | Mod: GP | Performed by: PHYSICAL THERAPIST

## 2025-05-23 ASSESSMENT — PAIN SCALES - GENERAL: PAINLEVEL_OUTOF10: 3

## 2025-05-23 ASSESSMENT — PAIN - FUNCTIONAL ASSESSMENT: PAIN_FUNCTIONAL_ASSESSMENT: 0-10

## 2025-05-23 NOTE — PROGRESS NOTES
Physical Therapy    Physical Therapy Treatment    Patient Name: Luciana Figueroa  MRN: [id number]  : 1955   Adan-Elsie Vargas  Today's Date: 2025  Time Calculation  Start Time: 830  Stop Time: 915  Time Calculation (min): 45 min  PT Therapeutic Procedures Time Entry  Therapeutic Exercise Time Entry: 45           General  General Comment: Visit  POC  Visit #7     Current Problem  Problem List Items Addressed This Visit           ICD-10-CM    Gait abnormality - Primary R26.9    Leg weakness, bilateral R29.898     Other Visit Diagnoses         Codes      Other symptoms and signs involving the musculoskeletal system     R29.898                 Subjective   Pt is happy that her gait pattern is improving   Pt.'s name and  were confirmed this date.    Pt. Reports compliance with HEP.    Precautions  Precautions  Precautions Comment: none    Pain  Pain Assessment: 0-10  0-10 (Numeric) Pain Score: 3  Pain Location: Knee  Pain Orientation: Left    Objective          Pt's name and  were confirmed today.        Treatments:    EXERCISES Date 2025  Date 25 Date 25  Date 25   VISIT # #4  #5  #6    HEP              Nustep L3 10min  L3 10min  L4 10min  L5 10min                 Shuttle  DLP 5B 3x10  5B 3x10  5B 3x10  6B 3x10   Shuttle SLP 4B 6a18Qdc   4B 3x05Jgq   4B 9g55Fkb   5B 3j02Jbm    Shuttle TR/HR                     Gait with long strides 5 laps Artis UE  5 laps Artis UE  5 laps Artis UE  5 laps Artis UE   Side stepping 3 laps 1 UE  5 laps Artis UE  5 laps Artis UE  5 laps Artis UE   Mini squat       Mini lunge       3 way LE raise 2x10ea Artis   2x10ea Artis   2x10ea Artis   2x10ea Artis    Retro amb  2 laps Artis UE  5 laps Artis UE  5 laps Artis UE  5 laps Artis UE   Hip hiking  Artis 2x10 2sec hold   Artis 2x10 2sec hold   Artis 2x10 2sec hold   Artis 2x10 2sec hold           Amb in villareal with right shoulder to wall   50' x4  50' x4                     PATIENT EDUCATION:    Access Code: [email or web address]  URL:  [email or web address]  Date: 04/25/2025  Prepared by: Ted Michaels    Exercises  - Seated March  - 3 x daily - 7 x weekly - 2 sets - 10 reps - 2 seconds hold  - Standing Hip Abduction  - 3 x daily - 7 x weekly - 2 sets - 10 reps - 2 seconds hold    Assessment:   Gait pattern is improving well with PT    Plan:  OP PT Plan  Treatment/Interventions: Gait training, Therapeutic activities, Therapeutic exercises  PT Plan: Skilled PT  PT Frequency: 2 times per week  Duration: 8 weeks    Goals:  Active       PT Problem       PT Goal 1       Start:  04/25/25    Expected End:  06/24/25       Short tem goals to be achieve within 4 weeks:    1. Pt's will negotiate 6 stairs with a step over pattern and one handrail to improve safety in her home    Long term goals to be achieved within 8 weeks:    1.Pt's rigo LE strength will improve to  4+/5 throughout to allow for improved gait safety  2.   Pt. will present with normalized gait pattern for improved gait safety  4.   Pt's stated goal is to improve gait

## 2025-05-27 ENCOUNTER — TREATMENT (OUTPATIENT)
Dept: PHYSICAL THERAPY | Facility: HOSPITAL | Age: 70
End: 2025-05-27
Payer: MEDICARE

## 2025-05-27 ENCOUNTER — TELEPHONE (OUTPATIENT)
Facility: CLINIC | Age: 70
End: 2025-05-27

## 2025-05-27 DIAGNOSIS — R26.9 GAIT ABNORMALITY: Primary | ICD-10-CM

## 2025-05-27 DIAGNOSIS — R29.898 OTHER SYMPTOMS AND SIGNS INVOLVING THE MUSCULOSKELETAL SYSTEM: ICD-10-CM

## 2025-05-27 DIAGNOSIS — R29.898 LEG WEAKNESS, BILATERAL: ICD-10-CM

## 2025-05-27 PROCEDURE — 97110 THERAPEUTIC EXERCISES: CPT | Mod: GP | Performed by: PHYSICAL THERAPIST

## 2025-05-27 ASSESSMENT — PAIN - FUNCTIONAL ASSESSMENT: PAIN_FUNCTIONAL_ASSESSMENT: 0-10

## 2025-05-27 ASSESSMENT — PAIN SCALES - GENERAL: PAINLEVEL_OUTOF10: 3

## 2025-05-27 NOTE — PROGRESS NOTES
Physical Therapy    Physical Therapy Treatment    Patient Name: Luciana Figueroa  MRN: [id number]  : 1955   Adan-Elsie Vargas  Today's Date: 2025  Time Calculation  Start Time: 830  Stop Time: 910  Time Calculation (min): 40 min  PT Therapeutic Procedures Time Entry  Therapeutic Exercise Time Entry: 40           General  General Comment: Visit  POC  Visit #8     Current Problem  Problem List Items Addressed This Visit           ICD-10-CM    Gait abnormality - Primary R26.9    Leg weakness, bilateral R29.898     Other Visit Diagnoses         Codes      Other symptoms and signs involving the musculoskeletal system     R29.898                 Subjective   Pt. Reports that her gait pattern is better but is challenging at the end of the day when she is fatigued   Pt.'s name and  were confirmed this date.    Pt. Reports compliance with HEP.    Precautions  Precautions  Precautions Comment: none    Pain  Pain Assessment: 0-10  0-10 (Numeric) Pain Score: 3  Pain Location: Knee  Pain Orientation: Left    Objective          Pt's name and  were confirmed today.        Treatments:  EXERCISES Date 2025  Date 25 Date 25  Date 25  Date 25   VISIT # #4  #5  #6     HEP                Nustep L3 10min  L3 10min  L4 10min  L5 10min   L5 10min                   Shuttle  DLP 5B 3x10  5B 3x10  5B 3x10  6B 3x10  6B 3x10   Shuttle SLP 4B 5o85Ccs   4B 4j21Hpj   4B 7f02Vcs   5B 3n98Iqf   5B 0m28Xnb    Shuttle TR/HR                        Gait with long strides 5 laps Artis UE  5 laps Artis UE  5 laps Atris UE  5 laps Artis UE  5 laps no UE   Side stepping 3 laps 1 UE  5 laps Artis UE  5 laps Artis UE  5 laps Artis UE  5 laps Artis UE   Mini squat        Mini lunge        3 way LE raise 2x10ea Artis   2x10ea Artis   2x10ea Artis   2x10ea Artis   2x10ea Artis    Retro amb  2 laps Artis UE  5 laps Artis UE  5 laps Artis UE  5 laps Artis UE  5 laps Artis UE   Hip hiking  Artis 2x10 2sec hold   Artis 2x10 2sec hold   Artis 2x10 2sec hold    Artis 2x10 2sec hold   Artis 2x10 2sec hold            Amb in villareal with right shoulder to wall   50' x4  50' x4  50' x4   Stair negotiation      3 steps x3               PATIENT EDUCATION:    Access Code: [email or web address]  URL: [email or web address]  Date: 04/25/2025  Prepared by: Ted Michaels    Exercises  - Seated March  - 3 x daily - 7 x weekly - 2 sets - 10 reps - 2 seconds hold  - Standing Hip Abduction  - 3 x daily - 7 x weekly - 2 sets - 10 reps - 2 seconds hold    Assessment:   Pt. Shows improved control of lateral sway while amb without UE support in parallel bars   Pt. Requires artis UE support descending stair when attempting to use a reciprocal pattern d/t left knee pain    Plan:  OP PT Plan  Treatment/Interventions: Gait training, Therapeutic activities, Therapeutic exercises  PT Plan: Skilled PT  PT Frequency: 2 times per week  Duration: 8 weeks    Goals:  Active       PT Problem       PT Goal 1       Start:  04/25/25    Expected End:  06/24/25       Short tem goals to be achieve within 4 weeks:    1. Pt's will negotiate 6 stairs with a step over pattern and one handrail to improve safety in her home    Long term goals to be achieved within 8 weeks:    1.Pt's artis LE strength will improve to  4+/5 throughout to allow for improved gait safety  2.   Pt. will present with normalized gait pattern for improved gait safety  4.   Pt's stated goal is to improve gait

## 2025-05-27 NOTE — TELEPHONE ENCOUNTER
Fax from [employer] regarding Important Patient Safety and Health Consideration regarding Dicyclomine has been scanned to chart

## 2025-05-30 ENCOUNTER — TREATMENT (OUTPATIENT)
Dept: PHYSICAL THERAPY | Facility: HOSPITAL | Age: 70
End: 2025-05-30
Payer: MEDICARE

## 2025-05-30 DIAGNOSIS — R29.898 OTHER SYMPTOMS AND SIGNS INVOLVING THE MUSCULOSKELETAL SYSTEM: ICD-10-CM

## 2025-05-30 DIAGNOSIS — R26.9 GAIT ABNORMALITY: Primary | ICD-10-CM

## 2025-05-30 DIAGNOSIS — R29.898 LEG WEAKNESS, BILATERAL: ICD-10-CM

## 2025-05-30 PROCEDURE — 97110 THERAPEUTIC EXERCISES: CPT | Mod: GP | Performed by: PHYSICAL THERAPIST

## 2025-05-30 ASSESSMENT — PAIN SCALES - GENERAL: PAINLEVEL_OUTOF10: 3

## 2025-05-30 ASSESSMENT — PAIN - FUNCTIONAL ASSESSMENT: PAIN_FUNCTIONAL_ASSESSMENT: 0-10

## 2025-05-30 NOTE — PROGRESS NOTES
Physical Therapy    Physical Therapy Treatment    Patient Name: Luciana Figueroa  MRN: [id number]  : 1955   Adan-Elsie Vargas  Today's Date: 2025  Time Calculation  Start Time: 830  Stop Time: 913  Time Calculation (min): 43 min  PT Therapeutic Procedures Time Entry  Therapeutic Exercise Time Entry: 43           General  General Comment: Visit  POC  Visit #9     Current Problem  Problem List Items Addressed This Visit           ICD-10-CM    Gait abnormality - Primary R26.9    Leg weakness, bilateral R29.898     Other Visit Diagnoses         Codes      Other symptoms and signs involving the musculoskeletal system     R29.898                 Subjective   Pt reports her gait pattern has improved with PT intervention   Pt.'s name and  were confirmed this date.    Pt. Reports compliance with HEP.    Precautions  Precautions  Precautions Comment: none    Pain  Pain Assessment: 0-10  0-10 (Numeric) Pain Score: 3  Pain Location: Knee  Pain Orientation: Left    Objective      Lower Extremity Strength:  LE strength not listed below is WNL  MMT 5/5 max  LEFT RIGHT   Hip Flexion  4+/5  4+/5        Hip Abduction  4+/5  4+/5   Hip Adduction  4+/5  4+/5   Knee Extension  4+/5  4+/5   Knee Flexion  4+/5  4+/5   Ankle DF  4+/5  4+/5       Pt. Requires rigo UE support descending stair when attempting to use a reciprocal pattern d/t left knee pain    Pt's name and  were confirmed today.    Outcome Measures:  Other Measures  Lower Extremity Funtional Score (LEFS): 55    Treatments:    EXERCISES  Date 25   VISIT #    HEP        Nustep   L5 10min           Shuttle  DLP  6B 3x10   Shuttle SLP  5B 5o13Dxh    Shuttle TR/HR            Gait with long strides  5 laps no UE   Side stepping  5 laps Rigo UE   Mini squat    Mini lunge    3 way LE raise  2x10ea Rigo    Retro amb   5 laps Rigo UE   Hip hiking   Rigo 2x10 2sec hold        Amb in villareal with right shoulder to wall     Stair negotiation  3 steps x3            PATIENT EDUCATION:    Access Code: [email or web address]  URL: [email or web address]  Date: 04/25/2025  Prepared by: Ted Michaels    Exercises  - Seated March  - 3 x daily - 7 x weekly - 2 sets - 10 reps - 2 seconds hold  - Standing Hip Abduction  - 3 x daily - 7 x weekly - 2 sets - 10 reps - 2 seconds hold    Assessment:   Gait pattern is much improved when patient is mindful of it.  She does continue to present with increase lateral sway despite strength improving well.  However, she is able to reduce this with the techniques she has learned in PT.    Plan:  Pt. Is discharged with ind HEP     Goals:  Active       PT Problem       PT Goal 1       Start:  04/25/25    Expected End:  06/24/25       Short tem goals to be achieve within 4 weeks:    1. Pt's will negotiate 6 stairs with a step over pattern and one handrail to improve safety in her home    Long term goals to be achieved within 8 weeks:    1.Pt's rigo LE strength will improve to  4+/5 throughout to allow for improved gait safety  2.   Pt. will present with normalized gait pattern for improved gait safety  4.   Pt's stated goal is to improve gait

## 2025-06-04 ENCOUNTER — TELEPHONE (OUTPATIENT)
Facility: CLINIC | Age: 70
End: 2025-06-04
Payer: MEDICARE

## 2025-06-04 ENCOUNTER — TELEPHONE (OUTPATIENT)
Dept: PULMONOLOGY | Facility: HOSPITAL | Age: 70
End: 2025-06-04
Payer: MEDICARE

## 2025-06-04 NOTE — TELEPHONE ENCOUNTER
Patient acknowledged understanding. All questions answered at this time. Patient is going to call her insurance company.    ----- Message from Brady Jackson sent at 6/3/2025  1:20 PM EDT -----  Hi Alma Rosa, She has tried multiple inhalers and did not feel as good as with Dulera and other inhalers were also expensive. I don't know what cost she has and if she has high deductible. Please advise her to contact her insurance too and get information if that is her deductible or prescription coverage issue.  ----- Message -----  From: Alma Rosa Fox RN  Sent: 6/3/2025   9:35 AM EDT  To: Brady Jackson MD    Patient called in stating she is unable to afford Dulera. Is there something else we can send or can we do the pharmacy program?

## 2025-06-06 ENCOUNTER — TELEPHONE (OUTPATIENT)
Dept: PULMONOLOGY | Facility: HOSPITAL | Age: 70
End: 2025-06-06
Payer: MEDICARE

## 2025-06-06 DIAGNOSIS — J45.40 MODERATE PERSISTENT ASTHMA WITHOUT COMPLICATION (HHS-HCC): Primary | ICD-10-CM

## 2025-06-06 RX ORDER — FLUTICASONE PROPIONATE AND SALMETEROL XINAFOATE 230; 21 UG/1; UG/1
2 AEROSOL, METERED RESPIRATORY (INHALATION)
Qty: 12 G | Refills: 11 | Status: SHIPPED | OUTPATIENT
Start: 2025-06-06 | End: 2026-06-06

## 2025-06-06 NOTE — TELEPHONE ENCOUNTER
Called and spoke with patient and let her know we sent Advair HFA. Let her know to call us back with any intolerances, if it was too expensive or with any further questions or concerns. Patient was agreeable to treatment plan and acknowledged understanding.     ----- Message from Brady Jackson sent at 6/6/2025  2:38 PM EDT -----  Regarding: RE: INHALER  She has reported that she does not like DPI and other inhalers have been expensive. I will order Advair HFA and hope insurance will cover that.  ----- Message -----  From: Desire Mcgovern MA  Sent: 6/6/2025   2:33 PM EDT  To: Brady Jackson MD  Subject: INHALER                                          Patient called in stating her Dulera inhaler is to expensive her insurance company prefers Advair she would like a script for Advair to [facility]

## 2025-06-13 ENCOUNTER — TELEPHONE (OUTPATIENT)
Dept: PULMONOLOGY | Facility: HOSPITAL | Age: 70
End: 2025-06-13
Payer: MEDICARE

## 2025-06-13 DIAGNOSIS — J45.40 MODERATE PERSISTENT ASTHMA WITHOUT COMPLICATION (HHS-HCC): Primary | ICD-10-CM

## 2025-06-13 DIAGNOSIS — R10.9 ABDOMINAL CRAMPING: ICD-10-CM

## 2025-06-13 RX ORDER — DICYCLOMINE HYDROCHLORIDE 10 MG/1
10 CAPSULE ORAL 3 TIMES DAILY PRN
Qty: 100 CAPSULE | Refills: 11 | Status: SHIPPED | OUTPATIENT
Start: 2025-06-13

## 2025-06-30 ENCOUNTER — TELEPHONE (OUTPATIENT)
Facility: CLINIC | Age: 70
End: 2025-06-30
Payer: MEDICARE

## 2025-07-07 ENCOUNTER — TELEPHONE (OUTPATIENT)
Facility: CLINIC | Age: 70
End: 2025-07-07
Payer: MEDICARE

## 2025-07-07 NOTE — TELEPHONE ENCOUNTER
Care Plan Consideration from [employer] regarding Tizanidine and Famotidine has been scanned to chart for your review

## 2025-07-18 ENCOUNTER — APPOINTMENT (OUTPATIENT)
Dept: PHARMACY | Facility: HOSPITAL | Age: 70
End: 2025-07-18
Payer: MEDICARE

## 2025-07-18 DIAGNOSIS — J45.40 MODERATE PERSISTENT ASTHMA WITHOUT COMPLICATION (HHS-HCC): ICD-10-CM

## 2025-07-18 RX ORDER — MOMETASONE FUROATE AND FORMOTEROL FUMARATE DIHYDRATE 200; 5 UG/1; UG/1
2 AEROSOL RESPIRATORY (INHALATION)
Qty: 13 G | Refills: 11 | Status: SHIPPED | OUTPATIENT
Start: 2025-07-18

## 2025-07-18 NOTE — PROGRESS NOTES
"  Pharmacist Clinic: Pulmonary Management    Luciana Figueroa is a 69 y.o. female was referred to Clinical Pharmacy Team for Pulmonary assessment.   Referring Provider: Radha Soliman APRN-C*  Last visit: 5/20/25  Next visit: 1/13/26    Subjective   Allergies[1]    HPI    PULMONARY ASSESSMENT  Patient has been diagnosed with: Asthma    Current regimen:  Albuterol HFA    Appropriate Inhaler Technique: yes    ASTHMA CONTROL:  -Primary symptoms: dyspnea, awakenings  -Symptoms/week: throughout the day  -Nighttime awakenings: > 1 time/week but not nightly   -ROSALIE use: several times/day  -Interference with normal activity: some limitation  -Asthma Exacerbations (requiring oral steroids): 0-1/year      Objective   There were no vitals taken for this visit.    Secondary Prevention (vaccines):  -Influenza: Date [Recommended]  -PCV20: Date [Recommended]  -COVID: Date [Consider]  -RSV: Date [Consider]  -TDAP: Date [Consider]  -Shingrix: Date [Consider, recalls she has gotten this]    Pulmonary Functions Testing Results:  No results found for: \"FEV1\", \"FVC\", \"JNJ7UNU\", \"TLC\", \"DLCO\"    Lab Review  Lab Results   Component Value Date    BILITOT 0.4 05/03/2022    CALCIUM 9.6 05/03/2022    CO2 28 05/03/2022     05/03/2022    CREATININE 0.72 05/03/2022    GLUCOSE 88 05/03/2022    ALKPHOS 69 05/03/2022    K 3.4 (L) 05/03/2022    PROT 7.3 05/03/2022     05/03/2022    AST 15 05/03/2022    ALT 15 05/03/2022    BUN 21 05/03/2022    ANIONGAP 11 05/03/2022    MG 1.90 11/07/2019     10/27/2020    ALBUMIN 4.2 05/03/2022    LIPASE 30 10/28/2020    GFRF >90 05/03/2022     Hemoglobin   Date Value Ref Range Status   05/03/2022 14.4 12.0 - 16.0 g/dL Final     WBC   Date Value Ref Range Status   05/03/2022 7.9 4.4 - 11.3 x10E9/L Final     Platelets   Date Value Ref Range Status   05/03/2022 271 150 - 450 x10E9/L Final       The ASCVD Risk score (Marissa DK, et al., 2019) failed to calculate for the following reasons:    Cannot " find a previous HDL lab    Cannot find a previous total cholesterol lab    Current Outpatient Medications   Medication Instructions    acetaminophen (TYLENOL) 325 mg, Every 4 hours PRN    albuterol (ProAir HFA) 90 mcg/actuation inhaler 2 puffs, inhalation, Every 4 hours PRN    busPIRone (BUSPAR) 15 mg, oral, Every 12 hours    carvedilol (Coreg) 25 mg tablet 1 tablet, 2 times daily (morning and late afternoon)    cetirizine (ZYRTEC) 5 mg, oral, Daily    dicyclomine (BENTYL) 10 mg, oral, 3 times daily PRN    estradiol (Estrace) 0.01 % (0.1 mg/gram) vaginal cream Insert pea size amount into vagina every night for 2 weeks, then 2x/week after    famotidine (PEPCID) 20 mg, oral, Daily RT    ferrous gluconate (Fergon) 324 (38 Fe) mg tablet 38 mg of elemental iron, Nightly    fesoterodine (TOVIAZ) 4 mg, oral, Daily    fesoterodine (TOVIAZ) 4 mg, oral, Daily    mometasone-formoterol (Dulera) 200-5 mcg/actuation inhaler 2 puffs, inhalation, 2 times daily RT, Rinse mouth with water after use.    pantoprazole (PROTONIX) 40 mg, oral, Daily    rosuvastatin (CRESTOR) 20 mg, Daily    sertraline (Zoloft) 100 mg tablet 1.5 tablets, Daily    tiZANidine (Zanaflex) 4 mg tablet 1 tablet, 3 times daily PRN       Drug Interactions:  None requiring intervention    Affordability/Accessibility:  Struggles with adherence due to high copay and fixed income    Assessment/Plan   Problem List Items Addressed This Visit       Asthma    Relevant Medications    mometasone-formoterol (Dulera) 200-5 mcg/actuation inhaler    Other Relevant Orders    Referral to Clinical Pharmacy     Continue goal of starting Dulera to reduce reliance on daily rescue inhaler   Counseled on admin and side effect mgmt  Follow up 8/21/25 @ 0840     Patient Assistance Screening (VAF)    Patient verbally reports monthly or yearly income which is less than 400% federal poverty level     Application for program has been submitted for the following medications:  Dulera    Patient has been informed that program team will be reaching out to them to discuss necessary documentation, instructed to answer phone/return voicemail.     Patient aware this process may take up to 6 weeks.     If approved medication must be filled through [facility] pharmacy and may be picked up or mailed to patient.       Ezekiel Horton RPh    Continue all meds under the continuation of care with the referring provider and clinical pharmacy team.    Verbal consent to manage patient's drug therapy was obtained from the patient. They were informed they may decline to participate or withdraw from participation in pharmacy services at any time.         [1] No Known Allergies

## 2025-08-20 DIAGNOSIS — J45.40 MODERATE PERSISTENT ASTHMA WITHOUT COMPLICATION (HHS-HCC): ICD-10-CM

## 2025-08-20 RX ORDER — ALBUTEROL SULFATE 90 UG/1
2 INHALANT RESPIRATORY (INHALATION) EVERY 4 HOURS PRN
Qty: 18 G | Refills: 11 | Status: SHIPPED | OUTPATIENT
Start: 2025-08-20 | End: 2026-08-20

## 2025-08-21 ENCOUNTER — APPOINTMENT (OUTPATIENT)
Dept: PHARMACY | Facility: HOSPITAL | Age: 70
End: 2025-08-21
Payer: MEDICARE

## 2025-09-18 ENCOUNTER — APPOINTMENT (OUTPATIENT)
Dept: PHARMACY | Facility: HOSPITAL | Age: 70
End: 2025-09-18
Payer: MEDICARE

## 2026-01-13 ENCOUNTER — APPOINTMENT (OUTPATIENT)
Dept: PULMONOLOGY | Facility: HOSPITAL | Age: 71
End: 2026-01-13
Payer: MEDICARE